# Patient Record
Sex: FEMALE | Race: WHITE | Employment: OTHER | URBAN - METROPOLITAN AREA
[De-identification: names, ages, dates, MRNs, and addresses within clinical notes are randomized per-mention and may not be internally consistent; named-entity substitution may affect disease eponyms.]

---

## 2023-06-05 LAB
ALANINE AMINOTRANSFERASE (SGPT) (U/L) IN SER/PLAS: 11 U/L (ref 7–45)
ALBUMIN (G/DL) IN SER/PLAS: 4.5 G/DL (ref 3.4–5)
ALKALINE PHOSPHATASE (U/L) IN SER/PLAS: 74 U/L (ref 33–136)
ANION GAP IN SER/PLAS: 12 MMOL/L (ref 10–20)
ASPARTATE AMINOTRANSFERASE (SGOT) (U/L) IN SER/PLAS: 15 U/L (ref 9–39)
BILIRUBIN TOTAL (MG/DL) IN SER/PLAS: 0.5 MG/DL (ref 0–1.2)
CALCIUM (MG/DL) IN SER/PLAS: 9.5 MG/DL (ref 8.6–10.3)
CARBAMAZEPINE (UG/ML) IN SER/PLAS: 3.9 UG/ML (ref 4–12)
CARBON DIOXIDE, TOTAL (MMOL/L) IN SER/PLAS: 29 MMOL/L (ref 21–32)
CHLORIDE (MMOL/L) IN SER/PLAS: 103 MMOL/L (ref 98–107)
CHOLESTEROL (MG/DL) IN SER/PLAS: 215 MG/DL (ref 0–199)
CHOLESTEROL IN HDL (MG/DL) IN SER/PLAS: 85.3 MG/DL
CHOLESTEROL/HDL RATIO: 2.5
CREATININE (MG/DL) IN SER/PLAS: 0.78 MG/DL (ref 0.5–1.05)
ERYTHROCYTE DISTRIBUTION WIDTH (RATIO) BY AUTOMATED COUNT: 13.6 % (ref 11.5–14.5)
ERYTHROCYTE MEAN CORPUSCULAR HEMOGLOBIN CONCENTRATION (G/DL) BY AUTOMATED: 31.6 G/DL (ref 32–36)
ERYTHROCYTE MEAN CORPUSCULAR VOLUME (FL) BY AUTOMATED COUNT: 97 FL (ref 80–100)
ERYTHROCYTES (10*6/UL) IN BLOOD BY AUTOMATED COUNT: 4.51 X10E12/L (ref 4–5.2)
GFR FEMALE: 81 ML/MIN/1.73M2
GLUCOSE (MG/DL) IN SER/PLAS: 106 MG/DL (ref 74–99)
HEMATOCRIT (%) IN BLOOD BY AUTOMATED COUNT: 43.7 % (ref 36–46)
HEMOGLOBIN (G/DL) IN BLOOD: 13.8 G/DL (ref 12–16)
LDL: 102 MG/DL (ref 0–99)
LEUKOCYTES (10*3/UL) IN BLOOD BY AUTOMATED COUNT: 4.8 X10E9/L (ref 4.4–11.3)
PLATELETS (10*3/UL) IN BLOOD AUTOMATED COUNT: 149 X10E9/L (ref 150–450)
POTASSIUM (MMOL/L) IN SER/PLAS: 4.1 MMOL/L (ref 3.5–5.3)
PROTEIN TOTAL: 7.1 G/DL (ref 6.4–8.2)
SODIUM (MMOL/L) IN SER/PLAS: 140 MMOL/L (ref 136–145)
THYROTROPIN (MIU/L) IN SER/PLAS BY DETECTION LIMIT <= 0.05 MIU/L: 3.9 MIU/L (ref 0.44–3.98)
TRIGLYCERIDE (MG/DL) IN SER/PLAS: 139 MG/DL (ref 0–149)
UREA NITROGEN (MG/DL) IN SER/PLAS: 12 MG/DL (ref 6–23)
VLDL: 28 MG/DL (ref 0–40)

## 2023-09-09 PROBLEM — J30.2 SEASONAL ALLERGIES: Status: ACTIVE | Noted: 2023-09-09

## 2023-09-09 PROBLEM — G50.0 TRIGEMINAL NEURALGIA: Status: ACTIVE | Noted: 2023-09-09

## 2023-09-09 PROBLEM — N63.20 BREAST MASS, LEFT: Status: ACTIVE | Noted: 2023-09-09

## 2023-09-09 PROBLEM — R49.0 DYSPHONIA: Status: ACTIVE | Noted: 2023-09-09

## 2023-09-09 PROBLEM — Z79.899 ON CARBAMAZEPINE THERAPY: Status: ACTIVE | Noted: 2023-09-09

## 2023-09-09 PROBLEM — E55.9 VITAMIN D DEFICIENCY: Status: ACTIVE | Noted: 2023-09-09

## 2023-09-09 PROBLEM — Z78.0 MENOPAUSE: Status: ACTIVE | Noted: 2023-09-09

## 2023-09-09 PROBLEM — J38.3 VOCAL CORD ATROPHY: Status: ACTIVE | Noted: 2023-09-09

## 2023-09-09 PROBLEM — E66.812 CLASS 2 OBESITY WITH BODY MASS INDEX (BMI) OF 37.0 TO 37.9 IN ADULT: Status: ACTIVE | Noted: 2023-09-09

## 2023-09-09 PROBLEM — E66.9 CLASS 2 OBESITY WITH BODY MASS INDEX (BMI) OF 37.0 TO 37.9 IN ADULT: Status: ACTIVE | Noted: 2023-09-09

## 2023-09-09 PROBLEM — Z17.0 MALIGNANT NEOPLASM OF UPPER-OUTER QUADRANT OF LEFT BREAST IN FEMALE, ESTROGEN RECEPTOR POSITIVE (MULTI): Status: ACTIVE | Noted: 2023-09-09

## 2023-09-09 PROBLEM — R49.0 HOARSENESS: Status: ACTIVE | Noted: 2023-09-09

## 2023-09-09 PROBLEM — I10 ESSENTIAL HYPERTENSION: Status: ACTIVE | Noted: 2023-09-09

## 2023-09-09 PROBLEM — C44.319 BASAL CELL CARCINOMA (BCC) OF CHEEK: Status: ACTIVE | Noted: 2023-09-09

## 2023-09-09 PROBLEM — E78.5 HYPERLIPIDEMIA: Status: ACTIVE | Noted: 2023-09-09

## 2023-09-09 PROBLEM — R92.8 ABNORMAL FINDING ON BREAST IMAGING: Status: ACTIVE | Noted: 2023-09-09

## 2023-09-09 PROBLEM — C50.412 MALIGNANT NEOPLASM OF UPPER-OUTER QUADRANT OF LEFT BREAST IN FEMALE, ESTROGEN RECEPTOR POSITIVE (MULTI): Status: ACTIVE | Noted: 2023-09-09

## 2023-09-09 PROBLEM — J34.89 MAXILLARY SINUS MASS: Status: ACTIVE | Noted: 2023-09-09

## 2023-09-09 RX ORDER — CEPHRADINE 500 MG
1 CAPSULE ORAL EVERY OTHER DAY
COMMUNITY
End: 2024-06-10 | Stop reason: ALTCHOICE

## 2023-09-09 RX ORDER — ACETAMINOPHEN 325 MG/1
650 TABLET ORAL EVERY 6 HOURS PRN
COMMUNITY
Start: 2019-08-27 | End: 2024-06-10 | Stop reason: ALTCHOICE

## 2023-09-09 RX ORDER — AMLODIPINE BESYLATE 5 MG/1
5 TABLET ORAL 2 TIMES DAILY
COMMUNITY
Start: 2021-07-08 | End: 2023-10-13

## 2023-09-09 RX ORDER — METOPROLOL SUCCINATE 100 MG/1
1 TABLET, EXTENDED RELEASE ORAL DAILY
COMMUNITY
Start: 2019-08-27 | End: 2023-12-20

## 2023-09-09 RX ORDER — LISINOPRIL 20 MG/1
20 TABLET ORAL 2 TIMES DAILY
COMMUNITY
Start: 2021-12-28 | End: 2023-12-20

## 2023-09-09 RX ORDER — CARBAMAZEPINE 100 MG/1
100 TABLET, EXTENDED RELEASE ORAL EVERY 12 HOURS
COMMUNITY
End: 2024-06-10 | Stop reason: WASHOUT

## 2023-09-09 RX ORDER — PRAVASTATIN SODIUM 40 MG/1
1 TABLET ORAL DAILY
COMMUNITY
Start: 2019-08-27 | End: 2024-03-07

## 2023-09-09 RX ORDER — GABAPENTIN 300 MG/1
600 CAPSULE ORAL 3 TIMES DAILY
COMMUNITY
End: 2023-12-20

## 2023-09-09 RX ORDER — LORATADINE 10 MG/1
1 TABLET ORAL DAILY
COMMUNITY

## 2023-09-09 RX ORDER — DEXTROMETHORPHAN HYDROBROMIDE, GUAIFENESIN 5; 100 MG/5ML; MG/5ML
1 LIQUID ORAL 2 TIMES DAILY
COMMUNITY

## 2023-09-09 RX ORDER — CEPHALEXIN 500 MG/1
500 CAPSULE ORAL 3 TIMES DAILY
COMMUNITY
End: 2024-06-10 | Stop reason: ALTCHOICE

## 2023-10-12 ENCOUNTER — HOSPITAL ENCOUNTER (OUTPATIENT)
Dept: RADIOLOGY | Facility: HOSPITAL | Age: 73
Discharge: HOME | End: 2023-10-12
Payer: MEDICARE

## 2023-10-12 ENCOUNTER — OFFICE VISIT (OUTPATIENT)
Dept: SURGICAL ONCOLOGY | Facility: HOSPITAL | Age: 73
End: 2023-10-12
Payer: MEDICARE

## 2023-10-12 VITALS — SYSTOLIC BLOOD PRESSURE: 158 MMHG | DIASTOLIC BLOOD PRESSURE: 83 MMHG | HEART RATE: 87 BPM

## 2023-10-12 VITALS — HEIGHT: 67 IN | BODY MASS INDEX: 37.35 KG/M2 | WEIGHT: 238 LBS

## 2023-10-12 DIAGNOSIS — I10 PRIMARY HYPERTENSION: Primary | ICD-10-CM

## 2023-10-12 DIAGNOSIS — Z17.0 MALIGNANT NEOPLASM OF UPPER-OUTER QUADRANT OF LEFT BREAST IN FEMALE, ESTROGEN RECEPTOR POSITIVE (MULTI): Primary | ICD-10-CM

## 2023-10-12 DIAGNOSIS — C50.412 MALIGNANT NEOPLASM OF UPPER-OUTER QUADRANT OF LEFT BREAST IN FEMALE, ESTROGEN RECEPTOR POSITIVE (MULTI): Primary | ICD-10-CM

## 2023-10-12 DIAGNOSIS — C50.412 MALIGNANT NEOPLASM OF UPPER-OUTER QUADRANT OF LEFT FEMALE BREAST (MULTI): ICD-10-CM

## 2023-10-12 DIAGNOSIS — Z17.0 ESTROGEN RECEPTOR POSITIVE STATUS (ER+): ICD-10-CM

## 2023-10-12 PROCEDURE — 77066 DX MAMMO INCL CAD BI: CPT | Performed by: RADIOLOGY

## 2023-10-12 PROCEDURE — 99213 OFFICE O/P EST LOW 20 MIN: CPT | Performed by: SURGERY

## 2023-10-12 PROCEDURE — 3077F SYST BP >= 140 MM HG: CPT | Performed by: SURGERY

## 2023-10-12 PROCEDURE — 3079F DIAST BP 80-89 MM HG: CPT | Performed by: SURGERY

## 2023-10-12 PROCEDURE — 77066 DX MAMMO INCL CAD BI: CPT

## 2023-10-12 PROCEDURE — 3008F BODY MASS INDEX DOCD: CPT | Performed by: SURGERY

## 2023-10-12 PROCEDURE — 1036F TOBACCO NON-USER: CPT | Performed by: SURGERY

## 2023-10-12 PROCEDURE — G0279 TOMOSYNTHESIS, MAMMO: HCPCS | Performed by: RADIOLOGY

## 2023-10-12 NOTE — PROGRESS NOTES
" BREAST SURGICAL ONCOLOGY FOLLOW UP     Subjective   Kelli Ellis is a 73 y.o. female presents today for follow up carcinoma of the left breast.     Treatment history:    Surgery: 12/2/2022 MS PM SLNB (0/1) tumor 1.3cm margins negative [id number]   Radiation: 1/4 - 1/10/2023 in 5fx.   Systemic therapy: mammaprint low risk luminal A +0.329, no chemo.  Ultimately declined endocrine tx.    She had a spot on her left breast- what she describes as a \"brown mole\" on her breast.  This was biopsied by dermatology on 10/5/2023.  Biopsy results are still pending as of today.    Bilateral mammogram today: BIRADS 2    Review of Systems   Constitutional symptoms: Denies generalized fatigue.  Denies weight change, fevers/chills, difficulty sleeping   Eyes: Denies double vision, glaucoma, cataracts.  Ear/nose/throat/mouth: Denies hearing changes, sore throat, sinus problems.  Cardiovascular: No chest pain.  Denies irregular heartbeat.  Denies ankle swelling.  Respiratory: No wheezing, cough, or shortness of breath.  Gastrointestinal: No abdominal pain,  No nausea/vomiting.  No indigestion/heartburn.  No change in bowel habits.  No constipation or diarrhea.   Genitourinary: No urinary incontinence.  No urinary frequency.  No painful urination.  Musculoskeletal: No bone pain, no muscle pain, no joint pain.   Integumentary: No rash. No masses.  No changes in moles.  No easy bruising.  Neurological: No headaches.  No tremors. No numbness/tingling.  Psychiatric: No anxiety. No depression.  Endocrine: No excessive thirst.  Not too hot or too cold.  Not tired or fatigued.    Hematological/lymphatic: No swollen glands or blood clotting problems.  No bruising.    Objective   Physical Exam  General: Alert and oriented x 3.  Mood and affect are appropriate.  HEENT: EOMI, PERRLA.   Neck: supple, no masses, no cervical adenopathy.  Cardiovascular: no lower extremity edema.  Pulmonary: breathing non labored on room air.  GI: Abdomen soft, no " masses. No hepatomegaly or splenomegaly.  Lymph nodes: No supraclavicular or axillary adenopathy bilaterally. Well healed left axillary incision.  Musculoskeletal: Full range of motion in the upper extremities bilaterally.  Neuro: denies dizziness, tremors    Breasts: The breasts were examined in both the seated and supine positions.    RIGHT: The nipple is everted without nipple discharge.  There are no skin changes, skin thickening, or dimpling. There are no masses palpated in the RIGHT breast.  UIQ transverse scar.  LEFT: The nipple is everted without nipple discharge.  There are no skin changes, skin thickening, or dimpling. There are no masses palpated in the LEFT breast. Well healed periareolar incision.      Radiology review: All images and reports were personally reviewed.     Assessment/Plan     left breast DC g1 ER 95% IA 95% HER2- at 1:30 3cmFN measuring 1.3cm on final pathology s/p left MS PM SLNB   -[id number] stage: IA    Clinical breast exam and mammogram today are normal.  There is no evidence of recurrence.    Continue follow up with medical oncology as scheduled.    Will follow up in the breast center with my nurse practitioner partner in 1 year at the time of mammogram or sooner if there are any breast concerns.  I will see Kelli  on an as needed basis for any concerns.      Nataliia Brenner, DO

## 2023-10-13 ENCOUNTER — TELEPHONE (OUTPATIENT)
Dept: SURGICAL ONCOLOGY | Facility: HOSPITAL | Age: 73
End: 2023-10-13
Payer: MEDICARE

## 2023-10-13 RX ORDER — AMLODIPINE BESYLATE 5 MG/1
5 TABLET ORAL 2 TIMES DAILY
Qty: 180 TABLET | Refills: 1 | Status: SHIPPED | OUTPATIENT
Start: 2023-10-13 | End: 2024-04-18

## 2023-10-13 NOTE — TELEPHONE ENCOUNTER
I relayed to Ms. Ellis that Dr. Brenner received her pathology from the dermatologist and it is benign and not related to her breast cancer.  She recommends continued follow-up and skin checks as determined by the dermatologist.

## 2023-10-25 ENCOUNTER — TELEPHONE (OUTPATIENT)
Dept: PRIMARY CARE | Facility: CLINIC | Age: 73
End: 2023-10-25
Payer: MEDICARE

## 2023-11-13 ENCOUNTER — OFFICE VISIT (OUTPATIENT)
Dept: PRIMARY CARE | Facility: CLINIC | Age: 73
End: 2023-11-13
Payer: MEDICARE

## 2023-11-13 VITALS
HEIGHT: 66 IN | BODY MASS INDEX: 38.22 KG/M2 | WEIGHT: 237.8 LBS | SYSTOLIC BLOOD PRESSURE: 164 MMHG | HEART RATE: 79 BPM | DIASTOLIC BLOOD PRESSURE: 85 MMHG | TEMPERATURE: 97.7 F

## 2023-11-13 DIAGNOSIS — E78.49 OTHER HYPERLIPIDEMIA: ICD-10-CM

## 2023-11-13 DIAGNOSIS — E66.09 CLASS 2 OBESITY DUE TO EXCESS CALORIES WITHOUT SERIOUS COMORBIDITY WITH BODY MASS INDEX (BMI) OF 37.0 TO 37.9 IN ADULT: ICD-10-CM

## 2023-11-13 DIAGNOSIS — G50.0 TRIGEMINAL NEURALGIA: ICD-10-CM

## 2023-11-13 DIAGNOSIS — Z23 NEED FOR INFLUENZA VACCINATION: ICD-10-CM

## 2023-11-13 DIAGNOSIS — I10 ESSENTIAL HYPERTENSION: Primary | ICD-10-CM

## 2023-11-13 PROCEDURE — 3079F DIAST BP 80-89 MM HG: CPT | Performed by: FAMILY MEDICINE

## 2023-11-13 PROCEDURE — 1160F RVW MEDS BY RX/DR IN RCRD: CPT | Performed by: FAMILY MEDICINE

## 2023-11-13 PROCEDURE — 3077F SYST BP >= 140 MM HG: CPT | Performed by: FAMILY MEDICINE

## 2023-11-13 PROCEDURE — 99213 OFFICE O/P EST LOW 20 MIN: CPT | Performed by: FAMILY MEDICINE

## 2023-11-13 PROCEDURE — 90662 IIV NO PRSV INCREASED AG IM: CPT | Performed by: FAMILY MEDICINE

## 2023-11-13 PROCEDURE — 1036F TOBACCO NON-USER: CPT | Performed by: FAMILY MEDICINE

## 2023-11-13 PROCEDURE — 3008F BODY MASS INDEX DOCD: CPT | Performed by: FAMILY MEDICINE

## 2023-11-13 PROCEDURE — G0008 ADMIN INFLUENZA VIRUS VAC: HCPCS | Performed by: FAMILY MEDICINE

## 2023-11-13 PROCEDURE — 1159F MED LIST DOCD IN RCRD: CPT | Performed by: FAMILY MEDICINE

## 2023-11-13 ASSESSMENT — PATIENT HEALTH QUESTIONNAIRE - PHQ9
SUM OF ALL RESPONSES TO PHQ9 QUESTIONS 1 AND 2: 0
2. FEELING DOWN, DEPRESSED OR HOPELESS: NOT AT ALL
1. LITTLE INTEREST OR PLEASURE IN DOING THINGS: NOT AT ALL

## 2023-11-14 ASSESSMENT — ENCOUNTER SYMPTOMS
CARDIOVASCULAR NEGATIVE: 1
MUSCULOSKELETAL NEGATIVE: 1
GASTROINTESTINAL NEGATIVE: 1
RESPIRATORY NEGATIVE: 1
NEUROLOGICAL NEGATIVE: 1
PSYCHIATRIC NEGATIVE: 1
CONSTITUTIONAL NEGATIVE: 1
ENDOCRINE NEGATIVE: 1
ALLERGIC/IMMUNOLOGIC NEGATIVE: 1
EYES NEGATIVE: 1

## 2023-11-14 NOTE — PROGRESS NOTES
Zabrina Pereira is here for a follow-up on hypertension and trigeminal neuralgia.  She states that she is overall feeling well.  Her left leg wound has been healing.  She did go to the Center for treatment for this.  Her trigeminal neuralgia has been stable on the medications as noted.  She noticed a umbilical hernia several weeks ago without any discomfort redness or bowel problems.  Home blood pressures have been in the 140s over 80s.  She continues on her meds noted.    Patient ID: Kelli Ellis is a 73 y.o. female who presents for Follow-up (Umbilical hernia re-emerged:  mole removal on left chest: left leg swelling improving overall: ):    Problem List Items Addressed This Visit    None  Visit Diagnoses       Need for influenza vaccination        Relevant Orders    Flu vaccine, quadrivalent, high-dose, preservative free, age 65y+ (FLUZONE) (Completed)           Past Medical History:   Diagnosis Date    Body mass index (BMI) 36.0-36.9, adult 09/28/2021    BMI 36.0-36.9,adult    Breast cancer (CMS/HCC)     Dysphonia 11/16/2020    Dysphonia    Encounter for screening for malignant neoplasm of colon     Screen for colon cancer    Obesity, unspecified 05/19/2022    Class 2 obesity with body mass index (BMI) of 36.0 to 36.9 in adult    Personal history of irradiation     Personal history of malignant neoplasm of breast     History of malignant neoplasm of female breast    Personal history of other diseases of the circulatory system     History of hypertension    Personal history of other endocrine, nutritional and metabolic disease 09/28/2021    History of obesity    Personal history of other endocrine, nutritional and metabolic disease     History of high cholesterol    Personal history of other specified conditions 09/28/2021    History of hoarseness      Past Surgical History:   Procedure Laterality Date    BREAST BIOPSY      BREAST LUMPECTOMY      MOLE REMOVAL Left     chest  10/25/2023    OTHER SURGICAL HISTORY   08/01/2019    Hammer toe surgery    OTHER SURGICAL HISTORY  08/01/2019    Tubal ligation    OTHER SURGICAL HISTORY  05/19/2022    Mohs surgery    OTHER SURGICAL HISTORY  05/19/2022    Skin biopsy      Family History   Problem Relation Name Age of Onset    Other (cardiac disorder) Mother      Hyperlipidemia Mother      Hypertension Mother      Other (malignant neoplasm of vulva) Mother        Social History     Socioeconomic History    Marital status:      Spouse name: Not on file    Number of children: Not on file    Years of education: Not on file    Highest education level: Not on file   Occupational History    Not on file   Tobacco Use    Smoking status: Former     Types: Cigarettes    Smokeless tobacco: Never   Substance and Sexual Activity    Alcohol use: Yes     Alcohol/week: 2.0 standard drinks of alcohol     Types: 2 Standard drinks or equivalent per week    Drug use: Never    Sexual activity: Not on file   Other Topics Concern    Not on file   Social History Narrative    Not on file     Social Determinants of Health     Financial Resource Strain: Not on file   Food Insecurity: Not on file   Transportation Needs: Not on file   Physical Activity: Not on file   Stress: Not on file   Social Connections: Not on file   Intimate Partner Violence: Not on file   Housing Stability: Not on file      Chlorthalidone, Methylprednisolone, and Triamterene-hydrochlorothiazid   Current Outpatient Medications   Medication Sig Dispense Refill    acetaminophen (Tylenol 8 HOUR) 650 mg ER tablet Take 1 tablet (650 mg) by mouth 2 times a day.      amLODIPine (Norvasc) 5 mg tablet TAKE 1 TABLET BY MOUTH TWICE  DAILY 180 tablet 1    carBAMazepine XR (TEGretol XR) 100 mg 12 hr tablet Take 1 tablet (100 mg) by mouth every 12 hours.      cholecalciferol, vitamin D3, 250 mcg (10,000 unit) capsule Take 1 capsule (250 mcg) by mouth every other day.      gabapentin (Neurontin) 300 mg capsule Take 2 capsules (600 mg) by mouth 3 times  a day.      lisinopril 20 mg tablet Take 1 tablet (20 mg) by mouth twice a day.      loratadine (Claritin) 10 mg tablet Take 1 tablet (10 mg) by mouth once daily.      metoprolol succinate XL (Toprol-XL) 100 mg 24 hr tablet Take 1 tablet (100 mg) by mouth once daily.      pravastatin (Pravachol) 40 mg tablet Take 1 tablet (40 mg) by mouth once daily.      acetaminophen (Tylenol) 325 mg tablet Take 2 tablets (650 mg) by mouth every 6 hours if needed for mild pain (1 - 3).      cephalexin (Keflex) 500 mg capsule Take 1 capsule (500 mg) by mouth 3 times a day.       No current facility-administered medications for this visit.       Immunization History   Administered Date(s) Administered    Flu vaccine (IIV4), preservative free *Check age/dose* 11/15/2016, 11/01/2022    Flu vaccine, quadrivalent, high-dose, preservative free, age 65y+ (FLUZONE) 11/13/2023    Influenza, High Dose Seasonal, Preservative Free 10/18/2017, 09/24/2018, 10/01/2019, 10/01/2020    Influenza, Seasonal, Quadrivalent, Adjuvanted 10/11/2021    Influenza, Unspecified 12/13/2005, 12/11/2006, 10/17/2008, 12/01/2012, 11/01/2013, 09/01/2014, 10/06/2015, 10/01/2021    Moderna COVID-19 vaccine, bivalent, blue cap/gray label *Check age/dose* 10/17/2022    Moderna SARS-CoV-2 Vaccination 02/16/2021, 03/19/2021, 11/08/2021    Pneumococcal conjugate vaccine, 13-valent (PREVNAR 13) 10/18/2017    Pneumococcal polysaccharide vaccine, 23-valent, age 2 years and older (PNEUMOVAX 23) 09/24/2018    Tdap vaccine, age 7 year and older (BOOSTRIX) 08/14/2023    Zoster vaccine, recombinant, adult (SHINGRIX) 07/19/2021, 01/18/2022        Review of Systems   Constitutional: Negative.    HENT: Negative.     Eyes: Negative.    Respiratory: Negative.     Cardiovascular: Negative.    Gastrointestinal: Negative.    Endocrine: Negative.    Genitourinary: Negative.    Musculoskeletal: Negative.    Allergic/Immunologic: Negative.    Neurological: Negative.     Psychiatric/Behavioral: Negative.     All other systems reviewed and are negative.       Vitals:    11/13/23 1109   BP: 164/85   Pulse: 79   Temp: 36.5 °C (97.7 °F)     Vitals:    11/13/23 1109   Weight: 108 kg (237 lb 12.8 oz)      Physical Exam  Constitutional:       General: She is not in acute distress.     Appearance: Normal appearance.   Neck:      Vascular: No carotid bruit.   Cardiovascular:      Rate and Rhythm: Normal rate and regular rhythm.      Pulses: Normal pulses.      Heart sounds: Murmur heard.      No friction rub. No gallop.   Pulmonary:      Effort: Pulmonary effort is normal.      Breath sounds: Normal breath sounds.   Musculoskeletal:      Cervical back: Neck supple.   Neurological:      Mental Status: She is alert.   Psychiatric:         Mood and Affect: Mood normal.         Thought Content: Thought content normal.     Heart-regular S1-S2 with a grade 1/6 systolic ejection murmur at the left sternal border.  Left leg-the laceration is now healed with scar tissue forming.  There is diffuse swelling to the lower leg which the patient says has been chronic.  Abdomen-there is a very small umbilical hernia just to the superior aspect of the umbilicus it is soft without any redness firmness or tenderness  ASSESSMENT/PLAN:  Hypertension with elevated reading and whitecoat component  Trigeminal neuralgia stable  Cardiac murmur  Hyperlipidemia  History of breast cancer  Small umbilical hernia    Plan--continue current meds noted.  Continue to monitor home blood pressures  Begin exercise program and observe low-salt diet  Schedule for echocardiogram  Labs as noted are up-to-date  Follow-up with breast surgeon as scheduled  Monitor umbilical hernia for now call if any pain swelling or redness occur  Follow-up in 6 months and call as needed

## 2023-12-18 DIAGNOSIS — G50.0 TRIGEMINAL NEURALGIA: ICD-10-CM

## 2023-12-18 DIAGNOSIS — E78.49 OTHER HYPERLIPIDEMIA: ICD-10-CM

## 2023-12-18 DIAGNOSIS — I10 ESSENTIAL HYPERTENSION: ICD-10-CM

## 2023-12-20 RX ORDER — METOPROLOL SUCCINATE 100 MG/1
100 TABLET, EXTENDED RELEASE ORAL DAILY
Qty: 90 TABLET | Refills: 1 | Status: SHIPPED | OUTPATIENT
Start: 2023-12-20 | End: 2024-05-23

## 2023-12-20 RX ORDER — GABAPENTIN 300 MG/1
600 CAPSULE ORAL 3 TIMES DAILY
Qty: 540 CAPSULE | Refills: 1 | Status: SHIPPED | OUTPATIENT
Start: 2023-12-20 | End: 2024-05-23

## 2023-12-20 RX ORDER — LISINOPRIL 20 MG/1
20 TABLET ORAL 2 TIMES DAILY
Qty: 180 TABLET | Refills: 1 | Status: SHIPPED | OUTPATIENT
Start: 2023-12-20 | End: 2024-05-23

## 2023-12-20 RX ORDER — CARBAMAZEPINE 100 MG/1
100 CAPSULE, EXTENDED RELEASE ORAL EVERY 12 HOURS
Qty: 180 CAPSULE | Refills: 1 | Status: SHIPPED | OUTPATIENT
Start: 2023-12-20 | End: 2024-05-23

## 2024-02-07 ENCOUNTER — TELEPHONE (OUTPATIENT)
Dept: PRIMARY CARE | Facility: CLINIC | Age: 74
End: 2024-02-07
Payer: MEDICARE

## 2024-02-07 DIAGNOSIS — R01.1 MURMUR: Primary | ICD-10-CM

## 2024-02-07 NOTE — TELEPHONE ENCOUNTER
Patient calling to schedule Echo. No order in chart. Please place order and send to [facility] for scheduling. thanks

## 2024-02-16 ENCOUNTER — HOSPITAL ENCOUNTER (OUTPATIENT)
Dept: CARDIOLOGY | Facility: CLINIC | Age: 74
Discharge: HOME | End: 2024-02-16
Payer: MEDICARE

## 2024-02-16 VITALS
WEIGHT: 237 LBS | HEIGHT: 66 IN | BODY MASS INDEX: 38.09 KG/M2 | SYSTOLIC BLOOD PRESSURE: 154 MMHG | DIASTOLIC BLOOD PRESSURE: 90 MMHG

## 2024-02-16 DIAGNOSIS — R01.1 MURMUR: ICD-10-CM

## 2024-02-16 PROCEDURE — 93306 TTE W/DOPPLER COMPLETE: CPT | Performed by: INTERNAL MEDICINE

## 2024-02-16 PROCEDURE — 93306 TTE W/DOPPLER COMPLETE: CPT

## 2024-02-18 LAB
AORTIC VALVE MEAN GRADIENT: 13 MMHG
AORTIC VALVE PEAK VELOCITY: 2.45 M/S
AV PEAK GRADIENT: 24 MMHG
AVA (PEAK VEL): 1.59 CM2
AVA (VTI): 1.55 CM2
EJECTION FRACTION APICAL 4 CHAMBER: 58.1
LEFT VENTRICLE INTERNAL DIMENSION DIASTOLE: 4.49 CM (ref 3.5–6)
LEFT VENTRICULAR OUTFLOW TRACT DIAMETER: 1.9 CM
MITRAL VALVE E/A RATIO: 1.07
MITRAL VALVE E/E' RATIO: 8.7
RIGHT VENTRICLE PEAK SYSTOLIC PRESSURE: 39.2 MMHG

## 2024-03-17 DIAGNOSIS — I10 PRIMARY HYPERTENSION: ICD-10-CM

## 2024-03-19 RX ORDER — AMLODIPINE BESYLATE 5 MG/1
5 TABLET ORAL 2 TIMES DAILY
Qty: 180 TABLET | Refills: 1 | OUTPATIENT
Start: 2024-03-19

## 2024-04-17 DIAGNOSIS — I10 PRIMARY HYPERTENSION: ICD-10-CM

## 2024-04-18 RX ORDER — AMLODIPINE BESYLATE 5 MG/1
5 TABLET ORAL 2 TIMES DAILY
Qty: 180 TABLET | Refills: 1 | Status: SHIPPED | OUTPATIENT
Start: 2024-04-18 | End: 2024-06-10 | Stop reason: SDUPTHER

## 2024-05-23 DIAGNOSIS — G50.0 TRIGEMINAL NEURALGIA: ICD-10-CM

## 2024-05-23 DIAGNOSIS — I10 ESSENTIAL HYPERTENSION: ICD-10-CM

## 2024-05-23 RX ORDER — CARBAMAZEPINE 100 MG/1
100 CAPSULE, EXTENDED RELEASE ORAL EVERY 12 HOURS
Qty: 180 CAPSULE | Refills: 1 | Status: SHIPPED | OUTPATIENT
Start: 2024-05-23

## 2024-05-23 RX ORDER — GABAPENTIN 300 MG/1
600 CAPSULE ORAL 3 TIMES DAILY
Qty: 180 CAPSULE | Refills: 1 | Status: SHIPPED | OUTPATIENT
Start: 2024-05-23

## 2024-05-23 RX ORDER — METOPROLOL SUCCINATE 100 MG/1
100 TABLET, EXTENDED RELEASE ORAL DAILY
Qty: 90 TABLET | Refills: 1 | Status: SHIPPED | OUTPATIENT
Start: 2024-05-23

## 2024-05-23 RX ORDER — LISINOPRIL 20 MG/1
20 TABLET ORAL 2 TIMES DAILY
Qty: 180 TABLET | Refills: 1 | Status: SHIPPED | OUTPATIENT
Start: 2024-05-23

## 2024-06-07 DIAGNOSIS — E78.49 OTHER HYPERLIPIDEMIA: ICD-10-CM

## 2024-06-10 ENCOUNTER — OFFICE VISIT (OUTPATIENT)
Dept: PRIMARY CARE | Facility: CLINIC | Age: 74
End: 2024-06-10
Payer: MEDICARE

## 2024-06-10 VITALS
SYSTOLIC BLOOD PRESSURE: 151 MMHG | HEART RATE: 71 BPM | HEIGHT: 66 IN | DIASTOLIC BLOOD PRESSURE: 81 MMHG | BODY MASS INDEX: 38.73 KG/M2 | TEMPERATURE: 97.7 F | WEIGHT: 241 LBS

## 2024-06-10 DIAGNOSIS — E78.2 MIXED HYPERLIPIDEMIA: ICD-10-CM

## 2024-06-10 DIAGNOSIS — G50.0 TRIGEMINAL NEURALGIA: ICD-10-CM

## 2024-06-10 DIAGNOSIS — Z79.899 ON CARBAMAZEPINE THERAPY: ICD-10-CM

## 2024-06-10 DIAGNOSIS — I10 ESSENTIAL HYPERTENSION: ICD-10-CM

## 2024-06-10 PROCEDURE — 3077F SYST BP >= 140 MM HG: CPT | Performed by: FAMILY MEDICINE

## 2024-06-10 PROCEDURE — 1159F MED LIST DOCD IN RCRD: CPT | Performed by: FAMILY MEDICINE

## 2024-06-10 PROCEDURE — 3079F DIAST BP 80-89 MM HG: CPT | Performed by: FAMILY MEDICINE

## 2024-06-10 PROCEDURE — 1160F RVW MEDS BY RX/DR IN RCRD: CPT | Performed by: FAMILY MEDICINE

## 2024-06-10 PROCEDURE — 1036F TOBACCO NON-USER: CPT | Performed by: FAMILY MEDICINE

## 2024-06-10 PROCEDURE — 3008F BODY MASS INDEX DOCD: CPT | Performed by: FAMILY MEDICINE

## 2024-06-10 PROCEDURE — 99213 OFFICE O/P EST LOW 20 MIN: CPT | Performed by: FAMILY MEDICINE

## 2024-06-10 RX ORDER — AMLODIPINE BESYLATE 5 MG/1
5 TABLET ORAL DAILY
Qty: 90 TABLET | Refills: 1 | Status: SHIPPED | OUTPATIENT
Start: 2024-06-10 | End: 2024-06-10 | Stop reason: SDUPTHER

## 2024-06-10 RX ORDER — BISMUTH SUBSALICYLATE 262 MG
1 TABLET,CHEWABLE ORAL DAILY
COMMUNITY

## 2024-06-10 RX ORDER — METOPROLOL SUCCINATE 25 MG/1
25 TABLET, EXTENDED RELEASE ORAL DAILY
Qty: 90 TABLET | Refills: 1 | Status: SHIPPED | OUTPATIENT
Start: 2024-06-10

## 2024-06-10 RX ORDER — PRAVASTATIN SODIUM 40 MG/1
40 TABLET ORAL NIGHTLY
Qty: 90 TABLET | Refills: 1 | Status: SHIPPED | OUTPATIENT
Start: 2024-06-10

## 2024-06-10 RX ORDER — AMLODIPINE BESYLATE 5 MG/1
5 TABLET ORAL DAILY
Qty: 90 TABLET | Refills: 1 | Status: SHIPPED | OUTPATIENT
Start: 2024-06-10

## 2024-06-10 ASSESSMENT — PATIENT HEALTH QUESTIONNAIRE - PHQ9
1. LITTLE INTEREST OR PLEASURE IN DOING THINGS: NOT AT ALL
2. FEELING DOWN, DEPRESSED OR HOPELESS: NOT AT ALL
SUM OF ALL RESPONSES TO PHQ9 QUESTIONS 1 AND 2: 0

## 2024-06-10 NOTE — PROGRESS NOTES
Zabrina Pereira is here for follow-up on hypertension and hyperlipidemia.  She states that overall she has been doing well.  She has been noticing more swelling around her ankles.  She is on amlodipine 5 mg twice daily as well as her other meds.  She has no other complaints at the present time.  She sees Dr. Brenner for her breast cancer follow-up.    Patient ID: Kelli Ellis is a 73 y.o. female who presents for Follow-up:    Problem List Items Addressed This Visit    None     Past Medical History:   Diagnosis Date    Body mass index (BMI) 36.0-36.9, adult 09/28/2021    BMI 36.0-36.9,adult    Breast cancer (Multi)     Dysphonia 11/16/2020    Dysphonia    Encounter for screening for malignant neoplasm of colon     Screen for colon cancer    Obesity, unspecified 05/19/2022    Class 2 obesity with body mass index (BMI) of 36.0 to 36.9 in adult    Personal history of irradiation     Personal history of malignant neoplasm of breast     History of malignant neoplasm of female breast    Personal history of other diseases of the circulatory system     History of hypertension    Personal history of other endocrine, nutritional and metabolic disease 09/28/2021    History of obesity    Personal history of other endocrine, nutritional and metabolic disease     History of high cholesterol    Personal history of other specified conditions 09/28/2021    History of hoarseness      Past Surgical History:   Procedure Laterality Date    BREAST BIOPSY      BREAST LUMPECTOMY      MOLE REMOVAL Left     chest  10/25/2023    OTHER SURGICAL HISTORY  08/01/2019    Hammer toe surgery    OTHER SURGICAL HISTORY  08/01/2019    Tubal ligation    OTHER SURGICAL HISTORY  05/19/2022    Mohs surgery    OTHER SURGICAL HISTORY  05/19/2022    Skin biopsy      Family History   Problem Relation Name Age of Onset    Other (cardiac disorder) Mother      Hyperlipidemia Mother      Hypertension Mother      Other (malignant neoplasm of vulva) Mother         Social History     Socioeconomic History    Marital status:      Spouse name: Not on file    Number of children: Not on file    Years of education: Not on file    Highest education level: Not on file   Occupational History    Not on file   Tobacco Use    Smoking status: Former     Types: Cigarettes    Smokeless tobacco: Never   Substance and Sexual Activity    Alcohol use: Yes     Alcohol/week: 2.0 standard drinks of alcohol     Types: 2 Standard drinks or equivalent per week    Drug use: Never    Sexual activity: Not on file   Other Topics Concern    Not on file   Social History Narrative    Not on file     Social Determinants of Health     Financial Resource Strain: Not on file   Food Insecurity: Not on file   Transportation Needs: Not on file   Physical Activity: Not on file   Stress: Not on file   Social Connections: Not on file   Intimate Partner Violence: Not on file   Housing Stability: Not on file      Chlorthalidone, Methylprednisolone, and Triamterene-hydrochlorothiazid   Current Outpatient Medications   Medication Sig Dispense Refill    acetaminophen (Tylenol 8 HOUR) 650 mg ER tablet Take 1 tablet (650 mg) by mouth 2 times a day.      amLODIPine (Norvasc) 5 mg tablet TAKE 1 TABLET BY MOUTH TWICE  DAILY 180 tablet 1    carBAMazepine (Carbatrol) 100 mg 12 hr capsule TAKE 1 CAPSULE BY MOUTH EVERY 12 HOURS 180 capsule 1    gabapentin (Neurontin) 300 mg capsule TAKE 2 CAPSULES BY MOUTH 3 TIMES DAILY 180 capsule 1    lisinopril 20 mg tablet TAKE 1 TABLET BY MOUTH TWICE  DAILY 180 tablet 1    loratadine (Claritin) 10 mg tablet Take 1 tablet (10 mg) by mouth once daily.      metoprolol succinate XL (Toprol-XL) 100 mg 24 hr tablet TAKE 1 TABLET BY MOUTH DAILY 90 tablet 1    multivitamin tablet Take 1 tablet by mouth once daily.      pravastatin (Pravachol) 40 mg tablet TAKE 1 TABLET BY MOUTH AT  BEDTIME 90 tablet 1    acetaminophen (Tylenol) 325 mg tablet Take 2 tablets (650 mg) by mouth every 6 hours if  needed for mild pain (1 - 3).      carBAMazepine XR (TEGretol XR) 100 mg 12 hr tablet Take 1 tablet (100 mg) by mouth every 12 hours.      cephalexin (Keflex) 500 mg capsule Take 1 capsule (500 mg) by mouth 3 times a day.      cholecalciferol, vitamin D3, 250 mcg (10,000 unit) capsule Take 1 capsule (250 mcg) by mouth every other day.       No current facility-administered medications for this visit.       Immunization History   Administered Date(s) Administered    Flu vaccine (IIV4), preservative free *Check age/dose* 11/15/2016, 11/01/2022    Flu vaccine, quadrivalent, high-dose, preservative free, age 65y+ (FLUZONE) 11/13/2023    Influenza, High Dose Seasonal, Preservative Free 10/18/2017, 09/24/2018, 10/01/2019, 10/01/2020    Influenza, Seasonal, Quadrivalent, Adjuvanted 10/11/2021    Influenza, Unspecified 12/13/2005, 12/11/2006, 10/17/2008, 12/01/2012, 11/01/2013, 09/01/2014, 10/06/2015, 10/01/2021    Moderna COVID-19 vaccine, Fall 2023, 12 yeasrs and older (50mcg/0.5mL) 01/18/2024    Moderna COVID-19 vaccine, bivalent, blue cap/gray label *Check age/dose* 10/17/2022    Moderna SARS-CoV-2 Vaccination 02/16/2021, 03/19/2021, 11/08/2021    Pneumococcal conjugate vaccine, 13-valent (PREVNAR 13) 10/18/2017    Pneumococcal polysaccharide vaccine, 23-valent, age 2 years and older (PNEUMOVAX 23) 09/24/2018    Tdap vaccine, age 7 year and older (BOOSTRIX, ADACEL) 08/14/2023    Zoster vaccine, recombinant, adult (SHINGRIX) 07/19/2021, 01/18/2022        Review of Systems   Constitutional: Negative.    HENT: Negative.     Eyes: Negative.    Respiratory: Negative.     Cardiovascular: Negative.    Gastrointestinal: Negative.    Endocrine: Negative.    Genitourinary: Negative.    Musculoskeletal: Negative.    Skin: Negative.    Allergic/Immunologic: Negative.    Neurological: Negative.    Hematological: Negative.    Psychiatric/Behavioral: Negative.     All other systems reviewed and are negative.       Vitals:    06/10/24  0955   BP: 151/81   Pulse: 71   Temp: 36.5 °C (97.7 °F)     Vitals:    06/10/24 0955   Weight: 109 kg (241 lb)      Physical Exam  Constitutional:       General: She is not in acute distress.     Appearance: Normal appearance.   Cardiovascular:      Rate and Rhythm: Normal rate and regular rhythm.      Pulses: Normal pulses.      Heart sounds: Normal heart sounds. No murmur heard.     No friction rub. No gallop.   Pulmonary:      Effort: Pulmonary effort is normal. No respiratory distress.      Breath sounds: Normal breath sounds. No wheezing.   Musculoskeletal:      Right lower leg: Edema present.      Left lower leg: Edema present.   Neurological:      Mental Status: She is alert.          ASSESSMENT/PLAN: Hypertension with elevated reading.  Pedal edema probably secondary to amlodipine.  Decrease amlodipine 5 mg to 1 tab daily.  Continue lisinopril 20 mg twice daily.  Continue metoprolol succinate 100 mg in the morning and add 25 mg in the evening.  Continue to monitor home blood pressures.    Hyper lipidemia..  Check CMP lipid profile and TSH    Trigeminal neuralgia.  Continue current meds noted.  Patient is questioning about decreasing gabapentin.  She may gradually decreased the dosage of this medication.  Check carbamazepine level and CBC.    History of breast cancer.  Follow-up with breast surgeon who also orders mammograms.    Exercise regularly  Follow-up in 2 months and call as needed   Scribe Attestation  By signing my name below, I, Kinsey Yanez LPN, Scribe   attest that this documentation has been prepared under the direction and in the presence of Isaac Mendoza MD.

## 2024-06-10 NOTE — PATIENT INSTRUCTIONS
Decrease amlodipine to once daily  Add metoprolol succinate 25 mg in evening  Obtain labs  Follow up 2 months after beginning new blood pressure medication

## 2024-06-11 ASSESSMENT — ENCOUNTER SYMPTOMS
ALLERGIC/IMMUNOLOGIC NEGATIVE: 1
GASTROINTESTINAL NEGATIVE: 1
CARDIOVASCULAR NEGATIVE: 1
HEMATOLOGIC/LYMPHATIC NEGATIVE: 1
PSYCHIATRIC NEGATIVE: 1
NEUROLOGICAL NEGATIVE: 1
RESPIRATORY NEGATIVE: 1
CONSTITUTIONAL NEGATIVE: 1
ENDOCRINE NEGATIVE: 1
EYES NEGATIVE: 1
MUSCULOSKELETAL NEGATIVE: 1

## 2024-07-21 DIAGNOSIS — G50.0 TRIGEMINAL NEURALGIA: ICD-10-CM

## 2024-07-23 RX ORDER — GABAPENTIN 300 MG/1
600 CAPSULE ORAL 3 TIMES DAILY
Qty: 360 CAPSULE | Refills: 1 | Status: SHIPPED | OUTPATIENT
Start: 2024-07-23

## 2024-08-06 ENCOUNTER — LAB (OUTPATIENT)
Dept: LAB | Facility: LAB | Age: 74
End: 2024-08-06
Payer: MEDICARE

## 2024-08-06 DIAGNOSIS — I10 ESSENTIAL HYPERTENSION: ICD-10-CM

## 2024-08-06 DIAGNOSIS — Z79.899 ON CARBAMAZEPINE THERAPY: ICD-10-CM

## 2024-08-06 DIAGNOSIS — E78.2 MIXED HYPERLIPIDEMIA: ICD-10-CM

## 2024-08-06 DIAGNOSIS — G50.0 TRIGEMINAL NEURALGIA: ICD-10-CM

## 2024-08-06 LAB
ALBUMIN SERPL BCP-MCNC: 4.1 G/DL (ref 3.4–5)
ALP SERPL-CCNC: 71 U/L (ref 33–136)
ALT SERPL W P-5'-P-CCNC: 12 U/L (ref 7–45)
ANION GAP SERPL CALC-SCNC: 10 MMOL/L (ref 10–20)
AST SERPL W P-5'-P-CCNC: 14 U/L (ref 9–39)
BILIRUB SERPL-MCNC: 0.4 MG/DL (ref 0–1.2)
BUN SERPL-MCNC: 11 MG/DL (ref 6–23)
CALCIUM SERPL-MCNC: 9.1 MG/DL (ref 8.6–10.3)
CARBAMAZEPINE SERPL-MCNC: 4.5 UG/ML (ref 4–12)
CHLORIDE SERPL-SCNC: 105 MMOL/L (ref 98–107)
CHOLEST SERPL-MCNC: 202 MG/DL (ref 0–199)
CHOLESTEROL/HDL RATIO: 2.5
CO2 SERPL-SCNC: 27 MMOL/L (ref 21–32)
CREAT SERPL-MCNC: 0.65 MG/DL (ref 0.5–1.05)
EGFRCR SERPLBLD CKD-EPI 2021: >90 ML/MIN/1.73M*2
ERYTHROCYTE [DISTWIDTH] IN BLOOD BY AUTOMATED COUNT: 14 % (ref 11.5–14.5)
GLUCOSE SERPL-MCNC: 98 MG/DL (ref 74–99)
HCT VFR BLD AUTO: 43.5 % (ref 36–46)
HDLC SERPL-MCNC: 81.3 MG/DL
HGB BLD-MCNC: 14.3 G/DL (ref 12–16)
LDLC SERPL CALC-MCNC: 89 MG/DL
MCH RBC QN AUTO: 30.9 PG (ref 26–34)
MCHC RBC AUTO-ENTMCNC: 32.9 G/DL (ref 32–36)
MCV RBC AUTO: 94 FL (ref 80–100)
NON HDL CHOLESTEROL: 121 MG/DL (ref 0–149)
NRBC BLD-RTO: 0 /100 WBCS (ref 0–0)
PLATELET # BLD AUTO: 147 X10*3/UL (ref 150–450)
POTASSIUM SERPL-SCNC: 4.1 MMOL/L (ref 3.5–5.3)
PROT SERPL-MCNC: 6.6 G/DL (ref 6.4–8.2)
RBC # BLD AUTO: 4.63 X10*6/UL (ref 4–5.2)
SODIUM SERPL-SCNC: 138 MMOL/L (ref 136–145)
TRIGL SERPL-MCNC: 159 MG/DL (ref 0–149)
TSH SERPL-ACNC: 2.71 MIU/L (ref 0.44–3.98)
VLDL: 32 MG/DL (ref 0–40)
WBC # BLD AUTO: 5.7 X10*3/UL (ref 4.4–11.3)

## 2024-08-06 PROCEDURE — 85027 COMPLETE CBC AUTOMATED: CPT

## 2024-08-06 PROCEDURE — 80053 COMPREHEN METABOLIC PANEL: CPT

## 2024-08-06 PROCEDURE — 80061 LIPID PANEL: CPT

## 2024-08-06 PROCEDURE — 36415 COLL VENOUS BLD VENIPUNCTURE: CPT

## 2024-08-06 PROCEDURE — 84443 ASSAY THYROID STIM HORMONE: CPT

## 2024-08-06 PROCEDURE — 80156 ASSAY CARBAMAZEPINE TOTAL: CPT

## 2024-08-29 ENCOUNTER — APPOINTMENT (OUTPATIENT)
Dept: PRIMARY CARE | Facility: CLINIC | Age: 74
End: 2024-08-29
Payer: MEDICARE

## 2024-08-29 VITALS
HEIGHT: 66 IN | SYSTOLIC BLOOD PRESSURE: 167 MMHG | BODY MASS INDEX: 38.89 KG/M2 | TEMPERATURE: 98 F | WEIGHT: 242 LBS | HEART RATE: 73 BPM | DIASTOLIC BLOOD PRESSURE: 96 MMHG

## 2024-08-29 DIAGNOSIS — I10 ESSENTIAL HYPERTENSION: ICD-10-CM

## 2024-08-29 DIAGNOSIS — E66.9 CLASS 2 OBESITY WITH BODY MASS INDEX (BMI) OF 39.0 TO 39.9 IN ADULT, UNSPECIFIED OBESITY TYPE, UNSPECIFIED WHETHER SERIOUS COMORBIDITY PRESENT: ICD-10-CM

## 2024-08-29 DIAGNOSIS — Z87.891 FORMER SMOKER: ICD-10-CM

## 2024-08-29 DIAGNOSIS — E78.2 MIXED HYPERLIPIDEMIA: ICD-10-CM

## 2024-08-29 DIAGNOSIS — Z00.00 MEDICARE ANNUAL WELLNESS VISIT, SUBSEQUENT: ICD-10-CM

## 2024-08-29 PROCEDURE — 3080F DIAST BP >= 90 MM HG: CPT | Performed by: FAMILY MEDICINE

## 2024-08-29 PROCEDURE — 3008F BODY MASS INDEX DOCD: CPT | Performed by: FAMILY MEDICINE

## 2024-08-29 PROCEDURE — 1159F MED LIST DOCD IN RCRD: CPT | Performed by: FAMILY MEDICINE

## 2024-08-29 PROCEDURE — 99213 OFFICE O/P EST LOW 20 MIN: CPT | Performed by: FAMILY MEDICINE

## 2024-08-29 PROCEDURE — 1170F FXNL STATUS ASSESSED: CPT | Performed by: FAMILY MEDICINE

## 2024-08-29 PROCEDURE — 3077F SYST BP >= 140 MM HG: CPT | Performed by: FAMILY MEDICINE

## 2024-08-29 PROCEDURE — 1160F RVW MEDS BY RX/DR IN RCRD: CPT | Performed by: FAMILY MEDICINE

## 2024-08-29 PROCEDURE — 1036F TOBACCO NON-USER: CPT | Performed by: FAMILY MEDICINE

## 2024-08-29 RX ORDER — METOPROLOL SUCCINATE 25 MG/1
25 TABLET, EXTENDED RELEASE ORAL EVERY EVENING
Qty: 90 TABLET | Refills: 1 | Status: SHIPPED | OUTPATIENT
Start: 2024-08-29

## 2024-08-29 ASSESSMENT — ACTIVITIES OF DAILY LIVING (ADL)
GROCERY_SHOPPING: INDEPENDENT
BATHING: INDEPENDENT
DRESSING: INDEPENDENT
TAKING_MEDICATION: INDEPENDENT
DOING_HOUSEWORK: INDEPENDENT
MANAGING_FINANCES: INDEPENDENT

## 2024-08-29 ASSESSMENT — ENCOUNTER SYMPTOMS
ALLERGIC/IMMUNOLOGIC NEGATIVE: 1
CARDIOVASCULAR NEGATIVE: 1
GASTROINTESTINAL NEGATIVE: 1
PSYCHIATRIC NEGATIVE: 1
EYES NEGATIVE: 1
CONSTITUTIONAL NEGATIVE: 1
NEUROLOGICAL NEGATIVE: 1
HEMATOLOGIC/LYMPHATIC NEGATIVE: 1
ENDOCRINE NEGATIVE: 1
MUSCULOSKELETAL NEGATIVE: 1
RESPIRATORY NEGATIVE: 1

## 2024-08-29 NOTE — PROGRESS NOTES
Zabrina Pereira is here for a follow-up on her hypertension and medication changes.  She was also scheduled for a annual wellness visit as well.  At her last office visit 2 months ago we had decreased her amlodipine to 5 mg daily Because of pedal edema and increased her dose of metoprolol as noted.  She continues on her other meds as noted.  Her home blood pressures have all been normal since then.  The last reading 136/83 yesterday and 135/75 131/80 previously.  She does have a history of whitecoat hypertension.  She had a basal cell carcinoma diagnosed on the tip of her nose and she is deciding between Mohs surgery or radiation therapy for this.  She has no other complaints at the present time    Patient ID: Kelli Ellis is a 74 y.o. female who presents for Medicare Annual Wellness Visit Subsequent and Follow-up:    Problem List Items Addressed This Visit    None     Past Medical History:   Diagnosis Date    Body mass index (BMI) 36.0-36.9, adult 09/28/2021    BMI 36.0-36.9,adult    Breast cancer (Multi)     Dysphonia 11/16/2020    Dysphonia    Encounter for screening for malignant neoplasm of colon     Screen for colon cancer    Obesity, unspecified 05/19/2022    Class 2 obesity with body mass index (BMI) of 36.0 to 36.9 in adult    Personal history of irradiation     Personal history of malignant neoplasm of breast     History of malignant neoplasm of female breast    Personal history of other diseases of the circulatory system     History of hypertension    Personal history of other endocrine, nutritional and metabolic disease 09/28/2021    History of obesity    Personal history of other endocrine, nutritional and metabolic disease     History of high cholesterol    Personal history of other specified conditions 09/28/2021    History of hoarseness      Past Surgical History:   Procedure Laterality Date    BREAST BIOPSY      BREAST LUMPECTOMY      MOLE REMOVAL Left     chest  10/25/2023    OTHER SURGICAL  HISTORY  08/01/2019    Hammer toe surgery    OTHER SURGICAL HISTORY  08/01/2019    Tubal ligation    OTHER SURGICAL HISTORY  05/19/2022    Mohs surgery    OTHER SURGICAL HISTORY  05/19/2022    Skin biopsy      Family History   Problem Relation Name Age of Onset    Other (cardiac disorder) Mother      Hyperlipidemia Mother      Hypertension Mother      Other (malignant neoplasm of vulva) Mother        Social History     Socioeconomic History    Marital status:      Spouse name: Not on file    Number of children: Not on file    Years of education: Not on file    Highest education level: Not on file   Occupational History    Not on file   Tobacco Use    Smoking status: Former     Types: Cigarettes    Smokeless tobacco: Never   Substance and Sexual Activity    Alcohol use: Yes     Alcohol/week: 2.0 standard drinks of alcohol     Types: 2 Standard drinks or equivalent per week    Drug use: Never    Sexual activity: Not on file   Other Topics Concern    Not on file   Social History Narrative    Not on file     Social Determinants of Health     Financial Resource Strain: Not on file   Food Insecurity: Not on file   Transportation Needs: Not on file   Physical Activity: Not on file   Stress: Not on file   Social Connections: Not on file   Intimate Partner Violence: Not on file   Housing Stability: Not on file      Chlorthalidone, Methylprednisolone, and Triamterene-hydrochlorothiazid   Current Outpatient Medications   Medication Sig Dispense Refill    acetaminophen (Tylenol 8 HOUR) 650 mg ER tablet Take 1 tablet (650 mg) by mouth 2 times a day.      amLODIPine (Norvasc) 5 mg tablet Take 1 tablet (5 mg) by mouth once daily. 90 tablet 1    carBAMazepine (Carbatrol) 100 mg 12 hr capsule TAKE 1 CAPSULE BY MOUTH EVERY 12 HOURS 180 capsule 1    gabapentin (Neurontin) 300 mg capsule TAKE 2 CAPSULES BY MOUTH 3 TIMES DAILY 360 capsule 1    lisinopril 20 mg tablet TAKE 1 TABLET BY MOUTH TWICE  DAILY 180 tablet 1    loratadine  (Claritin) 10 mg tablet Take 1 tablet (10 mg) by mouth once daily.      metoprolol succinate XL (Toprol-XL) 100 mg 24 hr tablet TAKE 1 TABLET BY MOUTH DAILY 90 tablet 1    metoprolol succinate XL (Toprol-XL) 25 mg 24 hr tablet Take 1 tablet (25 mg) by mouth once daily. Do not crush or chew. 90 tablet 1    multivitamin tablet Take 1 tablet by mouth once daily.      pravastatin (Pravachol) 40 mg tablet TAKE 1 TABLET BY MOUTH AT  BEDTIME 90 tablet 1     No current facility-administered medications for this visit.       Immunization History   Administered Date(s) Administered    Flu vaccine (IIV4), preservative free *Check age/dose* 11/15/2016, 11/01/2022    Flu vaccine, quadrivalent, high-dose, preservative free, age 65y+ (FLUZONE) 11/13/2023    Flu vaccine, trivalent, preservative free, HIGH-DOSE, age 65y+ (Fluzone) 10/18/2017, 09/24/2018, 10/01/2019, 10/01/2020    Influenza, Seasonal, Quadrivalent, Adjuvanted 10/11/2021    Influenza, Unspecified 12/13/2005, 12/11/2006, 10/17/2008, 12/01/2012, 11/01/2013, 09/01/2014, 10/06/2015, 10/01/2021    Moderna COVID-19 vaccine, Fall 2023, 12 yeasrs and older (50mcg/0.5mL) 01/18/2024    Moderna COVID-19 vaccine, bivalent, blue cap/gray label *Check age/dose* 10/17/2022    Moderna SARS-CoV-2 Vaccination 02/16/2021, 03/19/2021, 11/08/2021    Pneumococcal conjugate vaccine, 13-valent (PREVNAR 13) 10/18/2017    Pneumococcal polysaccharide vaccine, 23-valent, age 2 years and older (PNEUMOVAX 23) 09/24/2018    Tdap vaccine, age 7 year and older (BOOSTRIX, ADACEL) 08/14/2023    Zoster vaccine, recombinant, adult (SHINGRIX) 07/19/2021, 01/18/2022        Review of Systems   Constitutional: Negative.    HENT: Negative.     Eyes: Negative.    Respiratory: Negative.     Cardiovascular: Negative.    Gastrointestinal: Negative.    Endocrine: Negative.    Genitourinary: Negative.    Musculoskeletal: Negative.    Skin: Negative.    Allergic/Immunologic: Negative.    Neurological: Negative.     Hematological: Negative.    Psychiatric/Behavioral: Negative.     All other systems reviewed and are negative.       Vitals:    08/29/24 1106   BP: (!) 167/96   Pulse: 73   Temp: 36.7 °C (98 °F)     Vitals:    08/29/24 1106   Weight: 110 kg (242 lb)      Physical Exam  Constitutional:       General: She is not in acute distress.     Appearance: Normal appearance. She is obese.   Neurological:      Mental Status: She is alert.     Lower legs-patient states that the edema has improved since decreasing amlodipine to 5 mg daily.  There is some swelling of the left lower leg remaining.  Patient states that it is better in the morning and worse as the day progresses.    ASSESSMENT/PLAN: Hypertension stable with whitecoat component.  Continue current medications noted.  Continue home blood pressure checks    Pedal edema improved.  Call if any worsening.  Elevate legs when possible.    Recurrent basal cell cancers of the facial area.  Patient to decide between Mohs or radiation therapy for this.  We discussed that Mohs surgery is not a traditional approach for this.  I told the patient that I do not have much experience or knowledge of radiation therapy regarding basal cell cancer.    Hyperlipidemia improved with cholesterol 202 HDL 81 and LDL 89.  We discussed the importance of a low-fat diet.  Continue pravastatin daily.    Follow-up in 4 months and call as needed     Scribe Attestation  By signing my name below, I, Kinsey Yanez LPN, Scribe   attest that this documentation has been prepared under the direction and in the presence of Isaac Mendoza MD.

## 2024-09-24 NOTE — PROGRESS NOTES
Kelli Ellis female   1950 74 y.o.   [id number]      Chief Complaint  Annual mammogram and exam, history of left breast cancer    History Of Present Illness  Kelli Ellis is a very pleasant 74 y.o.  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 2022 Nataliia Brenner performed left breast Magseed partial mastectomy and sentinel lymph node biopsy (0/3). Final pathology revealed 1.3cm IDC with negative margins. She completed post-operative radiation on 1/10/2023. Mammaprint low risk luminal A. Per patient report, she was informed she could complete radiation or endocrine therapy. She was not told to do both. She declined endocrine therapy and opted for radiation. She has a remote history of right breast excisional biopsy, benign. She presents today for annual mammogram and exam. She denies any new masses or lumps.   Stage IA [id number]    BREAST IMAGING: 10/12/2023 Bilateral diagnostic mammogram, indicates BI-RADS Category 2.     REPRODUCTIVE HISTORY: menarche age 14, , first birth age 23, did not breastfeed, OCP's x 10 years, natural menopause age 50s, no HRT, scattered fibroglandular tissue    FAMILY CANCER HISTORY:   Mother: Vulvar cancer, 60s,  from MI    Surgical History  She has a past surgical history that includes Other surgical history (2019); Other surgical history (2019); Other surgical history (2022); Other surgical history (2022); Breast biopsy; Breast lumpectomy; and Mole removal (Left).     Social History  She reports that she has quit smoking. Her smoking use included cigarettes. She has never used smokeless tobacco. She reports current alcohol use of about 2.0 standard drinks of alcohol per week. She reports that she does not use drugs.    Family History  Family History   Problem Relation Name Age of Onset    Other (cardiac disorder) Mother Mom     Hyperlipidemia Mother Mom     Hypertension  Mother Mom     Other (malignant neoplasm of vulva) Mother Mom     Hypertension Brother Bill     Hypertension Brother Yordan         Allergies  Chlorthalidone, Methylprednisolone, and Triamterene-hydrochlorothiazid    Medications  Current Outpatient Medications   Medication Instructions    acetaminophen (Tylenol 8 HOUR) 650 mg ER tablet 1 tablet, 2 times daily    amLODIPine (NORVASC) 5 mg, oral, Daily    carBAMazepine (CARBATROL) 100 mg, oral, Every 12 hours    gabapentin (NEURONTIN) 600 mg, oral, 3 times daily    lisinopril 20 mg, oral, 2 times daily    loratadine (Claritin) 10 mg tablet 1 tablet, Daily    metoprolol succinate XL (TOPROL-XL) 100 mg, oral, Daily    metoprolol succinate XL (TOPROL-XL) 25 mg, oral, Every evening, Do not crush or chew.    multivitamin tablet 1 tablet, Daily    pravastatin (PRAVACHOL) 40 mg, oral, Nightly         REVIEW OF SYSTEMS    Constitutional:  Negative for appetite change, fatigue, fever and unexpected weight change.   HENT:  Negative for ear pain, hearing loss, nosebleeds, sore throat and trouble swallowing.    Eyes:  Negative for discharge, itching and visual disturbance.   Respiratory:  Negative for cough, chest tightness and shortness of breath.    Cardiovascular:  Negative for chest pain, palpitations and leg swelling.   Breast: as indicated in HPI  Gastrointestinal:  Negative for abdominal pain, constipation, diarrhea and nausea.   Endocrine: Negative for cold intolerance and heat intolerance.   Genitourinary:  Negative for dysuria, frequency, hematuria, pelvic pain and vaginal bleeding.   Musculoskeletal:  Negative for arthralgias, back pain, gait problem, joint swelling and myalgias.   Skin:  Negative for color change and rash.   Allergic/Immunologic: Negative for environmental allergies and food allergies.   Neurological:  Negative for dizziness, tremors, speech difficulty, weakness, numbness and headaches.   Hematological:  Does not bruise/bleed easily.    Psychiatric/Behavioral:  Negative for agitation, dysphoric mood and sleep disturbance. The patient is not nervous/anxious.         Past Medical History  She has a past medical history of Body mass index (BMI) 36.0-36.9, adult (09/28/2021), Breast cancer (Multi), Dysphonia (11/16/2020), Encounter for screening for malignant neoplasm of colon, Obesity, unspecified (05/19/2022), Personal history of irradiation, Personal history of malignant neoplasm of breast, Personal history of other diseases of the circulatory system, Personal history of other endocrine, nutritional and metabolic disease (09/28/2021), Personal history of other endocrine, nutritional and metabolic disease, and Personal history of other specified conditions (09/28/2021).     Physical Exam  Patient is alert and oriented x3 and in a relaxed and appropriate mood. Her gait is steady and hand grasps are equal. Sclera is clear. The breasts are nearly symmetrical. The tissue is soft without palpable abnormalities, discrete nodules or masses. The right breast has a well-healed excisional biopsy incision, superior medial quadrant. The left breast has a well-healed incision from previous benign skin lesion superior medial quadrant. The left breast has a well-healed partial circumareolar incision. The left axilla has a well-healed biopsy incision. The skin and nipples appear normal. There is no cervical, supraclavicular or axillary lymphadenopathy. Heart rate and rhythm normal, S1 and S2 appreciated. The lungs are clear to auscultation bilaterally. Abdomen is soft and non-tender.       Physical Exam  Chest:              Last Recorded Vitals  Vitals:    10/16/24 0916   BP: (!) 157/105   Pulse: 73   Resp: 16       Relevant Results   Time was spent discussing digital images of the radiology testing with the patient. I explained the results in depth, along with suggested explanation for follow up recommendations based on the testing results. BI-RADS Category  2    Imaging    Narrative & Impression   Interpreted By:  Carmella Baldwin,   STUDY:  BI MAMMO BILATERAL DIAGNOSTIC TOMOSYNTHESIS;  10/16/2024 10:23 am      ACCESSION NUMBER(S):  [id number]      ORDERING CLINICIAN:  SOBEIDA CAPELLAN      INDICATION:  Left lumpectomy with radiation. Benign right excisional biopsy.      ,C50.412 Malignant neoplasm of upper-outer quadrant of left female  breast,Z17.0 Estrogen receptor positive status (ER+)      COMPARISON:  10/12/2023, 10/11/2022      FINDINGS:  MAMMOGRAPHY: 2D and tomosynthesis images were reviewed at 1 mm slice  thickness.      Density:  There are scattered areas of fibroglandular density.      There is postsurgical scarring, skin retraction and surgical clips in  the central left breast from previous lumpectomy. There is left  axillary postoperative scarring and surgical clips. No suspicious  masses or calcifications are identified.      IMPRESSION:  Left post treatment changes. No mammographic evidence of malignancy.      BI-RADS CATEGORY:  BI-RADS Category:  2 Benign.  Recommendation:  Annual Screening.  Recommended Date:  1 Year.  Laterality:  Bilateral.         Assessment/Plan   Normal clinical exam and imaging, history of left breast cancer, declined endocrine therapy, no family history of breast cancer, scattered fibroglandular tissue    Plan: Return in one year for bilateral screening mammogram and office visit.     Patient Discussion/Summary  Your clinical examination and imaging are normal. Please return in one year for bilateral screening mammogram and office visit or sooner if you have any problems or concerns.     You can see your health information, review clinical summaries from office visits & test results online when you follow your health with MY  Chart, a personal health record. To sign up go to [email or web address]. If you need assistance with signing up or trouble getting into your account call [employer] Patient Line 24/7 at  [phone].    My office phone number is [phone] if you need to get in touch with me or have additional questions or concerns. Thank you for choosing [facility] and trusting me as your healthcare provider. I look forward to seeing you again at your next office visit. I am honored to be a provider on your health care team and I remain dedicated to helping you achieve your health goals.      Heather Gutierrez, APRN-CNP

## 2024-10-15 ENCOUNTER — APPOINTMENT (OUTPATIENT)
Dept: SURGICAL ONCOLOGY | Facility: HOSPITAL | Age: 74
End: 2024-10-15
Payer: MEDICARE

## 2024-10-16 ENCOUNTER — HOSPITAL ENCOUNTER (OUTPATIENT)
Dept: RADIOLOGY | Facility: HOSPITAL | Age: 74
Discharge: HOME | End: 2024-10-16
Payer: MEDICARE

## 2024-10-16 ENCOUNTER — OFFICE VISIT (OUTPATIENT)
Dept: SURGICAL ONCOLOGY | Facility: HOSPITAL | Age: 74
End: 2024-10-16
Payer: MEDICARE

## 2024-10-16 VITALS — WEIGHT: 243 LBS | HEIGHT: 66 IN | BODY MASS INDEX: 39.05 KG/M2

## 2024-10-16 VITALS
SYSTOLIC BLOOD PRESSURE: 157 MMHG | WEIGHT: 243 LBS | BODY MASS INDEX: 39.05 KG/M2 | HEART RATE: 73 BPM | HEIGHT: 66 IN | DIASTOLIC BLOOD PRESSURE: 105 MMHG | RESPIRATION RATE: 16 BRPM

## 2024-10-16 DIAGNOSIS — C50.412 MALIGNANT NEOPLASM OF UPPER-OUTER QUADRANT OF LEFT BREAST IN FEMALE, ESTROGEN RECEPTOR POSITIVE: ICD-10-CM

## 2024-10-16 DIAGNOSIS — Z08 ENCOUNTER FOR FOLLOW-UP SURVEILLANCE OF BREAST CANCER: Primary | ICD-10-CM

## 2024-10-16 DIAGNOSIS — Z85.3 ENCOUNTER FOR FOLLOW-UP SURVEILLANCE OF BREAST CANCER: Primary | ICD-10-CM

## 2024-10-16 DIAGNOSIS — Z17.0 MALIGNANT NEOPLASM OF UPPER-OUTER QUADRANT OF LEFT BREAST IN FEMALE, ESTROGEN RECEPTOR POSITIVE: ICD-10-CM

## 2024-10-16 DIAGNOSIS — Z12.31 ENCOUNTER FOR SCREENING MAMMOGRAM FOR MALIGNANT NEOPLASM OF BREAST: ICD-10-CM

## 2024-10-16 PROCEDURE — 1036F TOBACCO NON-USER: CPT | Performed by: NURSE PRACTITIONER

## 2024-10-16 PROCEDURE — 77066 DX MAMMO INCL CAD BI: CPT | Performed by: RADIOLOGY

## 2024-10-16 PROCEDURE — 3008F BODY MASS INDEX DOCD: CPT | Performed by: NURSE PRACTITIONER

## 2024-10-16 PROCEDURE — 3080F DIAST BP >= 90 MM HG: CPT | Performed by: NURSE PRACTITIONER

## 2024-10-16 PROCEDURE — 77062 BREAST TOMOSYNTHESIS BI: CPT

## 2024-10-16 PROCEDURE — 3077F SYST BP >= 140 MM HG: CPT | Performed by: NURSE PRACTITIONER

## 2024-10-16 PROCEDURE — 99213 OFFICE O/P EST LOW 20 MIN: CPT | Performed by: NURSE PRACTITIONER

## 2024-10-16 PROCEDURE — 1159F MED LIST DOCD IN RCRD: CPT | Performed by: NURSE PRACTITIONER

## 2024-10-16 PROCEDURE — G0279 TOMOSYNTHESIS, MAMMO: HCPCS | Performed by: RADIOLOGY

## 2024-10-16 NOTE — PATIENT INSTRUCTIONS
Your clinical examination and imaging are normal. Please return in one year for bilateral screening mammogram and office visit or sooner if you have any problems or concerns.     You can see your health information, review clinical summaries from office visits & test results online when you follow your health with MY  Chart, a personal health record. To sign up go to [email or web address]. If you need assistance with signing up or trouble getting into your account call [employer] Patient Line 24/7 at [phone].    My office phone number is [phone] if you need to get in touch with me or have additional questions or concerns. Thank you for choosing [facility] and trusting me as your healthcare provider. I look forward to seeing you again at your next office visit. I am honored to be a provider on your health care team and I remain dedicated to helping you achieve your health goals.

## 2024-10-25 DIAGNOSIS — G50.0 TRIGEMINAL NEURALGIA: ICD-10-CM

## 2024-10-25 DIAGNOSIS — I10 ESSENTIAL HYPERTENSION: ICD-10-CM

## 2024-10-29 RX ORDER — LISINOPRIL 20 MG/1
20 TABLET ORAL 2 TIMES DAILY
Qty: 180 TABLET | Refills: 1 | Status: SHIPPED | OUTPATIENT
Start: 2024-10-29

## 2024-10-29 RX ORDER — GABAPENTIN 300 MG/1
600 CAPSULE ORAL 3 TIMES DAILY
Qty: 180 CAPSULE | Refills: 1 | Status: SHIPPED | OUTPATIENT
Start: 2024-10-29

## 2024-10-29 RX ORDER — METOPROLOL SUCCINATE 100 MG/1
100 TABLET, EXTENDED RELEASE ORAL DAILY
Qty: 90 TABLET | Refills: 1 | Status: SHIPPED | OUTPATIENT
Start: 2024-10-29

## 2024-10-29 RX ORDER — CARBAMAZEPINE 100 MG/1
100 CAPSULE, EXTENDED RELEASE ORAL EVERY 12 HOURS
Qty: 180 CAPSULE | Refills: 1 | Status: SHIPPED | OUTPATIENT
Start: 2024-10-29

## 2024-12-04 DIAGNOSIS — G50.0 TRIGEMINAL NEURALGIA: ICD-10-CM

## 2024-12-04 DIAGNOSIS — I10 ESSENTIAL HYPERTENSION: ICD-10-CM

## 2024-12-04 DIAGNOSIS — E78.49 OTHER HYPERLIPIDEMIA: ICD-10-CM

## 2024-12-04 RX ORDER — METOPROLOL SUCCINATE 25 MG/1
25 TABLET, EXTENDED RELEASE ORAL EVERY EVENING
Qty: 90 TABLET | Refills: 3 | Status: SHIPPED | OUTPATIENT
Start: 2024-12-04

## 2024-12-04 RX ORDER — GABAPENTIN 300 MG/1
600 CAPSULE ORAL 3 TIMES DAILY
Qty: 360 CAPSULE | Refills: 0 | Status: SHIPPED | OUTPATIENT
Start: 2024-12-04

## 2024-12-04 RX ORDER — PRAVASTATIN SODIUM 40 MG/1
40 TABLET ORAL NIGHTLY
Qty: 90 TABLET | Refills: 3 | Status: SHIPPED | OUTPATIENT
Start: 2024-12-04

## 2025-01-08 ENCOUNTER — APPOINTMENT (OUTPATIENT)
Dept: PRIMARY CARE | Facility: CLINIC | Age: 75
End: 2025-01-08
Payer: MEDICARE

## 2025-01-08 VITALS
TEMPERATURE: 97.9 F | BODY MASS INDEX: 38.74 KG/M2 | OXYGEN SATURATION: 96 % | WEIGHT: 240 LBS | DIASTOLIC BLOOD PRESSURE: 84 MMHG | SYSTOLIC BLOOD PRESSURE: 166 MMHG | HEART RATE: 78 BPM

## 2025-01-08 DIAGNOSIS — Z87.891 FORMER SMOKER: ICD-10-CM

## 2025-01-08 DIAGNOSIS — I10 ESSENTIAL HYPERTENSION: ICD-10-CM

## 2025-01-08 DIAGNOSIS — Z79.899 ON CARBAMAZEPINE THERAPY: ICD-10-CM

## 2025-01-08 DIAGNOSIS — E66.812 CLASS 2 OBESITY WITH BODY MASS INDEX (BMI) OF 38.0 TO 38.9 IN ADULT, UNSPECIFIED OBESITY TYPE, UNSPECIFIED WHETHER SERIOUS COMORBIDITY PRESENT: ICD-10-CM

## 2025-01-08 DIAGNOSIS — E78.2 MIXED HYPERLIPIDEMIA: ICD-10-CM

## 2025-01-08 DIAGNOSIS — G50.0 TRIGEMINAL NEURALGIA: ICD-10-CM

## 2025-01-08 PROCEDURE — 1159F MED LIST DOCD IN RCRD: CPT | Performed by: FAMILY MEDICINE

## 2025-01-08 PROCEDURE — 99213 OFFICE O/P EST LOW 20 MIN: CPT | Performed by: FAMILY MEDICINE

## 2025-01-08 PROCEDURE — 1160F RVW MEDS BY RX/DR IN RCRD: CPT | Performed by: FAMILY MEDICINE

## 2025-01-08 PROCEDURE — 3079F DIAST BP 80-89 MM HG: CPT | Performed by: FAMILY MEDICINE

## 2025-01-08 PROCEDURE — 1126F AMNT PAIN NOTED NONE PRSNT: CPT | Performed by: FAMILY MEDICINE

## 2025-01-08 PROCEDURE — 3077F SYST BP >= 140 MM HG: CPT | Performed by: FAMILY MEDICINE

## 2025-01-08 PROCEDURE — 1036F TOBACCO NON-USER: CPT | Performed by: FAMILY MEDICINE

## 2025-01-08 ASSESSMENT — PAIN SCALES - GENERAL: PAINLEVEL_OUTOF10: 0-NO PAIN

## 2025-01-08 NOTE — PROGRESS NOTES
Zabrina Pereira is here for a follow-up on her hypertension.  She states that she has been feeling well.  She has been under a lot of stress recently.  Her home blood pressures have been rising and are now usually with systolics in the 140s and 150s.  Previously they had been much better.  She continues on her medications as noted.  Her leg swelling has been stable.  Her trigeminal neuralgia symptoms are also stable with the use of carbamazepine and gabapentin as noted.  She had been on hydrochlorothiazide and chlorthalidone in the past which were discontinued because of electrolyte changes.  She has no other complaints at the present time.    Patient ID: Kelli Ellis is a 74 y.o. female who presents for Follow-up (No concerns) and Hypertension:    Problem List Items Addressed This Visit    None     Past Medical History:   Diagnosis Date    Body mass index (BMI) 36.0-36.9, adult 09/28/2021    BMI 36.0-36.9,adult    Breast cancer (Multi)     Dysphonia 11/16/2020    Dysphonia    Encounter for screening for malignant neoplasm of colon     Screen for colon cancer    Obesity, unspecified 05/19/2022    Class 2 obesity with body mass index (BMI) of 36.0 to 36.9 in adult    Personal history of irradiation     Personal history of malignant neoplasm of breast     History of malignant neoplasm of female breast    Personal history of other diseases of the circulatory system     History of hypertension    Personal history of other endocrine, nutritional and metabolic disease 09/28/2021    History of obesity    Personal history of other endocrine, nutritional and metabolic disease     History of high cholesterol    Personal history of other specified conditions 09/28/2021    History of hoarseness      Past Surgical History:   Procedure Laterality Date    BREAST BIOPSY      BREAST LUMPECTOMY      MOLE REMOVAL Left     chest  10/25/2023    OTHER SURGICAL HISTORY  08/01/2019    Hammer toe surgery    OTHER SURGICAL HISTORY   08/01/2019    Tubal ligation    OTHER SURGICAL HISTORY  05/19/2022    Mohs surgery    OTHER SURGICAL HISTORY  05/19/2022    Skin biopsy      Family History   Problem Relation Name Age of Onset    Other (cardiac disorder) Mother Mom     Hyperlipidemia Mother Mom     Hypertension Mother Mom     Other (malignant neoplasm of vulva) Mother Mom     Hypertension Brother Bill     Hypertension Brother Yordan       Social History     Socioeconomic History    Marital status:      Spouse name: Not on file    Number of children: Not on file    Years of education: Not on file    Highest education level: Not on file   Occupational History    Not on file   Tobacco Use    Smoking status: Former     Current packs/day: 0.00     Types: Cigarettes    Smokeless tobacco: Never    Tobacco comments:     Have not smoked for over 25 years   Vaping Use    Vaping status: Never Used   Substance and Sexual Activity    Alcohol use: Yes     Alcohol/week: 2.0 standard drinks of alcohol     Types: 2 Standard drinks or equivalent per week     Comment: Prefer wine, some weeks, none, some weeks a few    Drug use: Never    Sexual activity: Not Currently     Partners: Male     Birth control/protection: None     Comment: Age - 74   Other Topics Concern    Not on file   Social History Narrative    Not on file     Social Drivers of Health     Financial Resource Strain: Not on file   Food Insecurity: Not on file   Transportation Needs: Not on file   Physical Activity: Not on file   Stress: Not on file   Social Connections: Not on file   Intimate Partner Violence: Not on file   Housing Stability: Not on file      Chlorthalidone, Methylprednisolone, and Triamterene-hydrochlorothiazid   Current Outpatient Medications   Medication Sig Dispense Refill    carBAMazepine (Carbatrol) 100 mg 12 hr capsule TAKE 1 CAPSULE BY MOUTH EVERY 12 HOURS 180 capsule 1    lisinopril 20 mg tablet TAKE 1 TABLET BY MOUTH TWICE  DAILY 180 tablet 1    metoprolol succinate XL  (Toprol-XL) 100 mg 24 hr tablet TAKE 1 TABLET BY MOUTH DAILY 90 tablet 1    acetaminophen (Tylenol 8 HOUR) 650 mg ER tablet Take 1 tablet (650 mg) by mouth 2 times a day.      amLODIPine (Norvasc) 5 mg tablet Take 1 tablet (5 mg) by mouth once daily. 90 tablet 1    gabapentin (Neurontin) 300 mg capsule TAKE 2 CAPSULES BY MOUTH 3 TIMES DAILY 360 capsule 0    loratadine (Claritin) 10 mg tablet Take 1 tablet (10 mg) by mouth once daily.      metoprolol succinate XL (Toprol-XL) 25 mg 24 hr tablet Take 1 tablet (25 mg) by mouth once daily in the evening. Do not crush or chew. 90 tablet 3    multivitamin tablet Take 1 tablet by mouth once daily.      pravastatin (Pravachol) 40 mg tablet TAKE 1 TABLET BY MOUTH AT  BEDTIME 90 tablet 3     No current facility-administered medications for this visit.       Immunization History   Administered Date(s) Administered    Flu vaccine (IIV4), preservative free *Check age/dose* 11/15/2016, 11/01/2022    Flu vaccine, quadrivalent, high-dose, preservative free, age 65y+ (FLUZONE) 11/13/2023    Flu vaccine, trivalent, preservative free, HIGH-DOSE, age 65y+ (Fluzone) 10/18/2017, 09/24/2018, 10/01/2019, 10/01/2020    Influenza, Seasonal, Quadrivalent, Adjuvanted 10/11/2021    Influenza, Unspecified 12/13/2005, 12/11/2006, 10/17/2008, 12/01/2012, 11/01/2013, 09/01/2014, 10/06/2015, 10/01/2021    Moderna COVID-19 vaccine, 12 years and older (50mcg/0.5mL)(Spikevax) 01/18/2024    Moderna COVID-19 vaccine, bivalent, blue cap/gray label *Check age/dose* 10/17/2022    Moderna SARS-CoV-2 Vaccination 02/16/2021, 03/19/2021, 11/08/2021    Pneumococcal conjugate vaccine, 13-valent (PREVNAR 13) 10/18/2017    Pneumococcal polysaccharide vaccine, 23-valent, age 2 years and older (PNEUMOVAX 23) 09/24/2018    Tdap vaccine, age 7 year and older (BOOSTRIX, ADACEL) 08/14/2023    Zoster vaccine, recombinant, adult (SHINGRIX) 07/19/2021, 01/18/2022        Review of Systems   Constitutional: Negative.    HENT:  Negative.     Eyes: Negative.    Respiratory: Negative.     Cardiovascular: Negative.    Gastrointestinal: Negative.    Endocrine: Negative.    Genitourinary: Negative.    Musculoskeletal: Negative.    Skin: Negative.    Allergic/Immunologic: Negative.    Neurological: Negative.    Hematological: Negative.    Psychiatric/Behavioral: Negative.     All other systems reviewed and are negative.       Vitals:    01/08/25 1119   BP: 166/84   Pulse: 78   Temp:    SpO2: 96%     Vitals:    01/08/25 1118   Weight: 109 kg (240 lb)      Physical Exam  Constitutional:       General: She is not in acute distress.     Appearance: Normal appearance.   Cardiovascular:      Rate and Rhythm: Normal rate and regular rhythm.      Pulses: Normal pulses.      Heart sounds: Normal heart sounds. No murmur heard.     No friction rub. No gallop.   Pulmonary:      Effort: Pulmonary effort is normal. No respiratory distress.      Breath sounds: Normal breath sounds. No wheezing or rales.   Neurological:      General: No focal deficit present.      Mental Status: She is alert and oriented to person, place, and time. Mental status is at baseline.          ASSESSMENT/PLAN: Hypertension with worsening control.  Discontinue amlodipine.  Higher doses of amlodipine in the past have caused worsening lower leg swelling.  Begin HCTZ 12.5 mg daily.  She does not recall any actual adverse events but did have some electrolyte abnormalities in the past.  This will be monitored closely.  Check CMP in 4 weeks.  Continue to monitor home blood pressures closely.  Call or send a listing of home blood pressures in 1 month.  Continue lisinopril and metoprolol as noted    Trigeminal neuralgia stable.  Continue current meds noted.  Check carbamazepine level    Hyperlipidemia.  Check lipid profile    History of breast cancer.  Follows up with breast surgery for annual checks and mammograms    Follow-up pending above and call as needed     Scribe Attestation  By  signing my name below, I, Kinsey Yanez LPN, Scribe   attest that this documentation has been prepared under the direction and in the presence of Isaac Mendoza MD.

## 2025-01-08 NOTE — PATIENT INSTRUCTIONS
Stop amlodipine  Begin hydrochlorothiazide 12.5 mg daily  Provide blood pressure readings via MyChart after beginning new medication  Return 4 months

## 2025-01-09 RX ORDER — HYDROCHLOROTHIAZIDE 12.5 MG/1
12.5 TABLET ORAL DAILY
Qty: 90 TABLET | Refills: 1 | Status: SHIPPED | OUTPATIENT
Start: 2025-01-09 | End: 2025-07-08

## 2025-01-09 ASSESSMENT — ENCOUNTER SYMPTOMS
HEMATOLOGIC/LYMPHATIC NEGATIVE: 1
EYES NEGATIVE: 1
CONSTITUTIONAL NEGATIVE: 1
ENDOCRINE NEGATIVE: 1
GASTROINTESTINAL NEGATIVE: 1
RESPIRATORY NEGATIVE: 1
PSYCHIATRIC NEGATIVE: 1
CARDIOVASCULAR NEGATIVE: 1
MUSCULOSKELETAL NEGATIVE: 1
ALLERGIC/IMMUNOLOGIC NEGATIVE: 1
NEUROLOGICAL NEGATIVE: 1

## 2025-01-26 DIAGNOSIS — G50.0 TRIGEMINAL NEURALGIA: ICD-10-CM

## 2025-01-28 RX ORDER — GABAPENTIN 300 MG/1
600 CAPSULE ORAL 3 TIMES DAILY
Qty: 360 CAPSULE | Refills: 0 | Status: SHIPPED | OUTPATIENT
Start: 2025-01-28

## 2025-03-06 DIAGNOSIS — G50.0 TRIGEMINAL NEURALGIA: ICD-10-CM

## 2025-03-06 DIAGNOSIS — I10 ESSENTIAL HYPERTENSION: ICD-10-CM

## 2025-03-08 RX ORDER — METOPROLOL SUCCINATE 100 MG/1
100 TABLET, EXTENDED RELEASE ORAL DAILY
Qty: 90 TABLET | Refills: 1 | Status: SHIPPED | OUTPATIENT
Start: 2025-03-08

## 2025-03-08 RX ORDER — LISINOPRIL 20 MG/1
20 TABLET ORAL 2 TIMES DAILY
Qty: 180 TABLET | Refills: 1 | Status: SHIPPED | OUTPATIENT
Start: 2025-03-08

## 2025-03-08 RX ORDER — CARBAMAZEPINE 100 MG/1
100 CAPSULE, EXTENDED RELEASE ORAL EVERY 12 HOURS
Qty: 180 CAPSULE | Refills: 1 | Status: SHIPPED | OUTPATIENT
Start: 2025-03-08

## 2025-03-12 DIAGNOSIS — I10 ESSENTIAL HYPERTENSION: Primary | ICD-10-CM

## 2025-03-13 LAB
ALBUMIN SERPL-MCNC: 4.6 G/DL (ref 3.6–5.1)
ALP SERPL-CCNC: 65 U/L (ref 37–153)
ALT SERPL-CCNC: 11 U/L (ref 6–29)
ANION GAP SERPL CALCULATED.4IONS-SCNC: 7 MMOL/L (CALC) (ref 7–17)
AST SERPL-CCNC: 16 U/L (ref 10–35)
BILIRUB SERPL-MCNC: 0.5 MG/DL (ref 0.2–1.2)
BUN SERPL-MCNC: 14 MG/DL (ref 7–25)
CALCIUM SERPL-MCNC: 9.7 MG/DL (ref 8.6–10.4)
CARBAMAZEPINE SERPL-MCNC: 4.3 MG/L (ref 4–12)
CHLORIDE SERPL-SCNC: 94 MMOL/L (ref 98–110)
CO2 SERPL-SCNC: 31 MMOL/L (ref 20–32)
CREAT SERPL-MCNC: 0.56 MG/DL (ref 0.6–1)
EGFRCR SERPLBLD CKD-EPI 2021: 97 ML/MIN/1.73M2
GLUCOSE SERPL-MCNC: 101 MG/DL (ref 65–99)
POTASSIUM SERPL-SCNC: 4.4 MMOL/L (ref 3.5–5.3)
PROT SERPL-MCNC: 7 G/DL (ref 6.1–8.1)
SODIUM SERPL-SCNC: 132 MMOL/L (ref 135–146)

## 2025-04-01 LAB
ANION GAP SERPL CALCULATED.4IONS-SCNC: 12 MMOL/L (CALC) (ref 7–17)
BUN SERPL-MCNC: 9 MG/DL (ref 7–25)
BUN/CREAT SERPL: 16 (CALC) (ref 6–22)
CALCIUM SERPL-MCNC: 9.5 MG/DL (ref 8.6–10.4)
CHLORIDE SERPL-SCNC: 92 MMOL/L (ref 98–110)
CO2 SERPL-SCNC: 26 MMOL/L (ref 20–32)
CREAT SERPL-MCNC: 0.55 MG/DL (ref 0.6–1)
EGFRCR SERPLBLD CKD-EPI 2021: 97 ML/MIN/1.73M2
GLUCOSE SERPL-MCNC: 100 MG/DL (ref 65–99)
POTASSIUM SERPL-SCNC: 4.1 MMOL/L (ref 3.5–5.3)
SODIUM SERPL-SCNC: 130 MMOL/L (ref 135–146)

## 2025-04-03 ENCOUNTER — APPOINTMENT (OUTPATIENT)
Dept: PRIMARY CARE | Facility: CLINIC | Age: 75
End: 2025-04-03
Payer: MEDICARE

## 2025-04-03 VITALS
SYSTOLIC BLOOD PRESSURE: 164 MMHG | BODY MASS INDEX: 38.58 KG/M2 | TEMPERATURE: 97.9 F | HEART RATE: 67 BPM | DIASTOLIC BLOOD PRESSURE: 87 MMHG | WEIGHT: 239 LBS | OXYGEN SATURATION: 97 %

## 2025-04-03 DIAGNOSIS — I10 ESSENTIAL HYPERTENSION: ICD-10-CM

## 2025-04-03 DIAGNOSIS — E66.812 CLASS 2 OBESITY WITH BODY MASS INDEX (BMI) OF 38.0 TO 38.9 IN ADULT, UNSPECIFIED OBESITY TYPE, UNSPECIFIED WHETHER SERIOUS COMORBIDITY PRESENT: ICD-10-CM

## 2025-04-03 DIAGNOSIS — R60.0 EDEMA OF LEFT LOWER EXTREMITY: ICD-10-CM

## 2025-04-03 DIAGNOSIS — Z87.891 FORMER SMOKER: ICD-10-CM

## 2025-04-03 PROCEDURE — 99213 OFFICE O/P EST LOW 20 MIN: CPT | Performed by: FAMILY MEDICINE

## 2025-04-03 PROCEDURE — 3079F DIAST BP 80-89 MM HG: CPT | Performed by: FAMILY MEDICINE

## 2025-04-03 PROCEDURE — 1160F RVW MEDS BY RX/DR IN RCRD: CPT | Performed by: FAMILY MEDICINE

## 2025-04-03 PROCEDURE — 3077F SYST BP >= 140 MM HG: CPT | Performed by: FAMILY MEDICINE

## 2025-04-03 PROCEDURE — 1159F MED LIST DOCD IN RCRD: CPT | Performed by: FAMILY MEDICINE

## 2025-04-03 PROCEDURE — 1036F TOBACCO NON-USER: CPT | Performed by: FAMILY MEDICINE

## 2025-04-03 RX ORDER — AMLODIPINE BESYLATE 5 MG/1
5 TABLET ORAL DAILY
Qty: 90 TABLET | Refills: 3 | Status: SHIPPED | OUTPATIENT
Start: 2025-04-03

## 2025-04-03 ASSESSMENT — PATIENT HEALTH QUESTIONNAIRE - PHQ9
2. FEELING DOWN, DEPRESSED OR HOPELESS: NOT AT ALL
1. LITTLE INTEREST OR PLEASURE IN DOING THINGS: NOT AT ALL
SUM OF ALL RESPONSES TO PHQ9 QUESTIONS 1 AND 2: 0

## 2025-04-03 ASSESSMENT — ENCOUNTER SYMPTOMS
FREQUENCY: 1
LIGHT-HEADEDNESS: 1

## 2025-04-03 NOTE — PROGRESS NOTES
Zabrina Pereira is here for follow-up on hypertension.  At her last office visit almost 3 months ago we had discontinued her amlodipine and begun HCTZ 12.5 mg daily.  With the HCTZ she has been noticing frequent urination and occasional orthostatic lightheadedness.  She began to notice some swelling in her left lower leg about 2 weeks ago.  She denies any injury and she has had no prolonged immobilization.  The right leg has been normal.  She did not have any swelling with lower doses of amlodipine.  She otherwise has been feeling well and has no other complaints.    Patient ID: Kelli Ellis is a 74 y.o. female who presents for Hypertension and Follow-up:    Problem List Items Addressed This Visit    None     Past Medical History:   Diagnosis Date    Body mass index (BMI) 36.0-36.9, adult 09/28/2021    BMI 36.0-36.9,adult    Breast cancer     Dysphonia 11/16/2020    Dysphonia    Encounter for screening for malignant neoplasm of colon     Screen for colon cancer    Obesity, unspecified 05/19/2022    Class 2 obesity with body mass index (BMI) of 36.0 to 36.9 in adult    Personal history of irradiation     Personal history of malignant neoplasm of breast     History of malignant neoplasm of female breast    Personal history of other diseases of the circulatory system     History of hypertension    Personal history of other endocrine, nutritional and metabolic disease 09/28/2021    History of obesity    Personal history of other endocrine, nutritional and metabolic disease     History of high cholesterol    Personal history of other specified conditions 09/28/2021    History of hoarseness      Past Surgical History:   Procedure Laterality Date    BREAST BIOPSY      BREAST LUMPECTOMY      MOLE REMOVAL Left     chest  10/25/2023    OTHER SURGICAL HISTORY  08/01/2019    Hammer toe surgery    OTHER SURGICAL HISTORY  08/01/2019    Tubal ligation    OTHER SURGICAL HISTORY  05/19/2022    Mohs surgery    OTHER SURGICAL HISTORY   05/19/2022    Skin biopsy      Family History   Problem Relation Name Age of Onset    Other (cardiac disorder) Mother Mom     Hyperlipidemia Mother Mom     Hypertension Mother Mom     Other (malignant neoplasm of vulva) Mother Mom     Hypertension Brother Bill     Hypertension Brother Yordan       Social History     Socioeconomic History    Marital status:      Spouse name: Not on file    Number of children: Not on file    Years of education: Not on file    Highest education level: Not on file   Occupational History    Not on file   Tobacco Use    Smoking status: Former     Current packs/day: 0.00     Types: Cigarettes    Smokeless tobacco: Never    Tobacco comments:     Have not smoked for over 25 years   Vaping Use    Vaping status: Never Used   Substance and Sexual Activity    Alcohol use: Yes     Alcohol/week: 2.0 standard drinks of alcohol     Types: 2 Standard drinks or equivalent per week     Comment: Prefer wine, some weeks, none, some weeks a few    Drug use: Never    Sexual activity: Not Currently     Partners: Male     Birth control/protection: None     Comment: Age - 74   Other Topics Concern    Not on file   Social History Narrative    Not on file     Social Drivers of Health     Financial Resource Strain: Not on file   Food Insecurity: Not on file   Transportation Needs: Not on file   Physical Activity: Not on file   Stress: Not on file   Social Connections: Not on file   Intimate Partner Violence: Not on file   Housing Stability: Not on file      Chlorthalidone, Methylprednisolone, and Triamterene-hydrochlorothiazid   Current Outpatient Medications   Medication Sig Dispense Refill    acetaminophen (Tylenol 8 HOUR) 650 mg ER tablet Take 1 tablet (650 mg) by mouth 2 times a day.      carBAMazepine (Carbatrol) 100 mg 12 hr capsule TAKE 1 CAPSULE BY MOUTH EVERY 12 HOURS 180 capsule 1    gabapentin (Neurontin) 300 mg capsule TAKE 2 CAPSULES BY MOUTH 3 TIMES DAILY 360 capsule 0    hydroCHLOROthiazide  (Microzide) 12.5 mg tablet Take 1 tablet (12.5 mg) by mouth once daily. 90 tablet 1    lisinopril 20 mg tablet TAKE 1 TABLET BY MOUTH TWICE  DAILY 180 tablet 1    loratadine (Claritin) 10 mg tablet Take 1 tablet (10 mg) by mouth once daily.      metoprolol succinate XL (Toprol-XL) 100 mg 24 hr tablet TAKE 1 TABLET BY MOUTH DAILY 90 tablet 1    metoprolol succinate XL (Toprol-XL) 25 mg 24 hr tablet Take 1 tablet (25 mg) by mouth once daily in the evening. Do not crush or chew. 90 tablet 3    pravastatin (Pravachol) 40 mg tablet TAKE 1 TABLET BY MOUTH AT  BEDTIME 90 tablet 3    multivitamin tablet Take 1 tablet by mouth once daily. (Patient not taking: Reported on 4/3/2025)       No current facility-administered medications for this visit.       Immunization History   Administered Date(s) Administered    Flu vaccine (IIV4), preservative free *Check age/dose* 11/15/2016, 11/01/2022    Flu vaccine, quadrivalent, high-dose, preservative free, age 65y+ (FLUZONE) 11/13/2023    Flu vaccine, trivalent, preservative free, HIGH-DOSE, age 65y+ (Fluzone) 10/18/2017, 09/24/2018, 10/01/2019, 10/01/2020    Influenza, Seasonal, Quadrivalent, Adjuvanted 10/11/2021    Influenza, Unspecified 12/13/2005, 12/11/2006, 10/17/2008, 12/01/2012, 11/01/2013, 09/01/2014, 10/06/2015, 10/01/2021    Influenza, trivalent, adjuvanted 10/29/2024    Moderna COVID-19 vaccine, 12 years and older (50mcg/0.5mL)(Spikevax) 01/18/2024, 12/27/2024    Moderna COVID-19 vaccine, bivalent, blue cap/gray label *Check age/dose* 10/17/2022    Moderna SARS-CoV-2 Vaccination 02/16/2021, 03/19/2021, 11/08/2021    Pneumococcal conjugate vaccine, 13-valent (PREVNAR 13) 10/18/2017    Pneumococcal polysaccharide vaccine, 23-valent, age 2 years and older (PNEUMOVAX 23) 09/24/2018    Tdap vaccine, age 7 year and older (BOOSTRIX, ADACEL) 08/14/2023    Zoster vaccine, recombinant, adult (SHINGRIX) 07/19/2021, 01/18/2022        Review of Systems   Cardiovascular:  Positive for  leg swelling.   Genitourinary:  Positive for frequency.   Neurological:  Positive for light-headedness.   All other systems reviewed and are negative.       Vitals:    04/03/25 1418   BP: 164/87   Pulse: 67   Temp: 36.6 °C (97.9 °F)   SpO2: 97%     Vitals:    04/03/25 1418   Weight: 108 kg (239 lb)      Physical Exam  Constitutional:       General: She is not in acute distress.     Appearance: Normal appearance.   Neurological:      Mental Status: She is alert.     Right lower leg-normal with minimal edema.  Left lower leg there is 2+ pitting edema of the left lower leg.  There is no calf tenderness induration or redness or warmth.    ASSESSMENT/PLAN: Hypertension with poor control.  The majority of her home blood pressure readings are with systolics above 150 and occasional diastolics above 90.  Discontinue HCTZ because of side effects.  Resume amlodipine 5 mg daily.  She understands potential for edema with amlodipine but this has not occurred with the lower dose.  Continue other meds for now as noted.  Continue to monitor home blood pressures    Nonspecific left lower leg edema.  Schedule for left leg venous duplex scan ASAP.  Call if any worsening.    Call with blood pressure readings in 2 weeks.  Follow-up in 4 to 6 weeks and call as needed       Scribe Attestation  By signing my name below, I, Kinsey Yanez LPN, Scribe   attest that this documentation has been prepared under the direction and in the presence of Isaac Mendoza MD.

## 2025-04-03 NOTE — PATIENT INSTRUCTIONS
Schedule for venous duplex  Resume amlodipine 5 mg daily  Continue to monitor home blood pressures

## 2025-04-09 ENCOUNTER — HOSPITAL ENCOUNTER (OUTPATIENT)
Dept: CARDIOLOGY | Facility: HOSPITAL | Age: 75
Discharge: HOME | End: 2025-04-09
Payer: MEDICARE

## 2025-04-09 DIAGNOSIS — R60.0 EDEMA OF LEFT LOWER EXTREMITY: ICD-10-CM

## 2025-04-09 PROCEDURE — 93971 EXTREMITY STUDY: CPT

## 2025-04-09 PROCEDURE — 93971 EXTREMITY STUDY: CPT | Performed by: INTERNAL MEDICINE

## 2025-04-20 DIAGNOSIS — G50.0 TRIGEMINAL NEURALGIA: ICD-10-CM

## 2025-04-23 RX ORDER — GABAPENTIN 300 MG/1
600 CAPSULE ORAL 3 TIMES DAILY
Qty: 360 CAPSULE | Refills: 0 | Status: SHIPPED | OUTPATIENT
Start: 2025-04-23

## 2025-05-12 DIAGNOSIS — I10 ESSENTIAL HYPERTENSION: ICD-10-CM

## 2025-05-14 ENCOUNTER — APPOINTMENT (OUTPATIENT)
Dept: PRIMARY CARE | Facility: CLINIC | Age: 75
End: 2025-05-14
Payer: MEDICARE

## 2025-05-14 VITALS
DIASTOLIC BLOOD PRESSURE: 100 MMHG | HEIGHT: 66 IN | BODY MASS INDEX: 38.18 KG/M2 | SYSTOLIC BLOOD PRESSURE: 159 MMHG | TEMPERATURE: 97.3 F | HEART RATE: 91 BPM | WEIGHT: 237.6 LBS

## 2025-05-14 DIAGNOSIS — Z00.00 MEDICARE ANNUAL WELLNESS VISIT, SUBSEQUENT: ICD-10-CM

## 2025-05-14 DIAGNOSIS — E66.812 CLASS 2 OBESITY WITH BODY MASS INDEX (BMI) OF 38.0 TO 38.9 IN ADULT, UNSPECIFIED OBESITY TYPE, UNSPECIFIED WHETHER SERIOUS COMORBIDITY PRESENT: ICD-10-CM

## 2025-05-14 DIAGNOSIS — G50.0 TRIGEMINAL NEURALGIA: ICD-10-CM

## 2025-05-14 DIAGNOSIS — E78.2 MIXED HYPERLIPIDEMIA: ICD-10-CM

## 2025-05-14 DIAGNOSIS — Z87.891 FORMER SMOKER: ICD-10-CM

## 2025-05-14 DIAGNOSIS — I10 ESSENTIAL HYPERTENSION: ICD-10-CM

## 2025-05-14 PROCEDURE — 3077F SYST BP >= 140 MM HG: CPT | Performed by: FAMILY MEDICINE

## 2025-05-14 PROCEDURE — 99213 OFFICE O/P EST LOW 20 MIN: CPT | Performed by: FAMILY MEDICINE

## 2025-05-14 PROCEDURE — 1159F MED LIST DOCD IN RCRD: CPT | Performed by: FAMILY MEDICINE

## 2025-05-14 PROCEDURE — 3008F BODY MASS INDEX DOCD: CPT | Performed by: FAMILY MEDICINE

## 2025-05-14 PROCEDURE — 1036F TOBACCO NON-USER: CPT | Performed by: FAMILY MEDICINE

## 2025-05-14 PROCEDURE — 1158F ADVNC CARE PLAN TLK DOCD: CPT | Performed by: FAMILY MEDICINE

## 2025-05-14 PROCEDURE — 1123F ACP DISCUSS/DSCN MKR DOCD: CPT | Performed by: FAMILY MEDICINE

## 2025-05-14 PROCEDURE — 3080F DIAST BP >= 90 MM HG: CPT | Performed by: FAMILY MEDICINE

## 2025-05-14 PROCEDURE — 1170F FXNL STATUS ASSESSED: CPT | Performed by: FAMILY MEDICINE

## 2025-05-14 PROCEDURE — 1160F RVW MEDS BY RX/DR IN RCRD: CPT | Performed by: FAMILY MEDICINE

## 2025-05-14 RX ORDER — GABAPENTIN 300 MG/1
600 CAPSULE ORAL 3 TIMES DAILY
Qty: 450 CAPSULE | Refills: 0 | Status: SHIPPED | OUTPATIENT
Start: 2025-05-14

## 2025-05-14 RX ORDER — HYDROCHLOROTHIAZIDE 12.5 MG/1
12.5 TABLET ORAL DAILY
Refills: 0 | OUTPATIENT
Start: 2025-05-14

## 2025-05-14 ASSESSMENT — ACTIVITIES OF DAILY LIVING (ADL)
MANAGING_FINANCES: INDEPENDENT
DOING_HOUSEWORK: INDEPENDENT
BATHING: INDEPENDENT
GROCERY_SHOPPING: INDEPENDENT
DRESSING: INDEPENDENT
TAKING_MEDICATION: INDEPENDENT

## 2025-05-14 ASSESSMENT — ENCOUNTER SYMPTOMS
MUSCULOSKELETAL NEGATIVE: 1
CONSTITUTIONAL NEGATIVE: 1
NEUROLOGICAL NEGATIVE: 1
HEMATOLOGIC/LYMPHATIC NEGATIVE: 1
RESPIRATORY NEGATIVE: 1
GASTROINTESTINAL NEGATIVE: 1
ALLERGIC/IMMUNOLOGIC NEGATIVE: 1
ENDOCRINE NEGATIVE: 1
PSYCHIATRIC NEGATIVE: 1
CARDIOVASCULAR NEGATIVE: 1
EYES NEGATIVE: 1

## 2025-05-14 NOTE — PROGRESS NOTES
Zabrina Pereira is here for a follow-up on her hypertension.  She is not here for a annual wellness visit.  Her home blood pressures for the month of April have mostly been elevated.  She has occasional normal readings.  She continues on amlodipine lisinopril and metoprolol as noted.  Her HCTZ had been discontinued because of lightheadedness and frequent urination.  She has been on various blood pressure medication combinations in the past.  She is trying to lose weight and monitors the salt in her diet.  She quit smoking many years ago.  She drinks 2 alcoholic beverages per week.        Patient ID: Kelli Ellis is a 74 y.o. female who presents for Medicare Annual Wellness Visit Subsequent (awv):    Problem List Items Addressed This Visit       Essential hypertension    Relevant Orders    Referral to Nephrology    Hyperlipidemia    Trigeminal neuralgia    Relevant Medications    gabapentin (Neurontin) 300 mg capsule    Former smoker     Other Visit Diagnoses         Medicare annual wellness visit, subsequent          Class 2 obesity with body mass index (BMI) of 38.0 to 38.9 in adult, unspecified obesity type, unspecified whether serious comorbidity present               Medical History[1]   Surgical History[2]   Family History[3]   Social History     Socioeconomic History    Marital status:      Spouse name: Not on file    Number of children: Not on file    Years of education: Not on file    Highest education level: Not on file   Occupational History    Not on file   Tobacco Use    Smoking status: Former     Types: Cigarettes     Start date:      Quit date:      Years since quittin.4    Smokeless tobacco: Never    Tobacco comments:     Have not smoked for over 25 years   Vaping Use    Vaping status: Never Used   Substance and Sexual Activity    Alcohol use: Yes     Alcohol/week: 2.0 standard drinks of alcohol     Types: 2 Standard drinks or equivalent per week     Comment: Prefer wine, some  weeks, none, some weeks a few    Drug use: Never    Sexual activity: Not Currently     Partners: Male     Birth control/protection: None     Comment: Age - 74   Other Topics Concern    Not on file   Social History Narrative    Not on file     Social Drivers of Health     Financial Resource Strain: Not on file   Food Insecurity: Not on file   Transportation Needs: Not on file   Physical Activity: Not on file   Stress: Not on file   Social Connections: Not on file   Intimate Partner Violence: Not on file   Housing Stability: Not on file      Chlorthalidone, Methylprednisolone, and Triamterene-hydrochlorothiazid   Current Medications[4]    Immunization History   Administered Date(s) Administered    Flu vaccine (IIV4), preservative free *Check age/dose* 11/15/2016, 11/01/2022    Flu vaccine, quadrivalent, high-dose, preservative free, age 65y+ (FLUZONE) 11/13/2023    Flu vaccine, trivalent, preservative free, HIGH-DOSE, age 65y+ (Fluzone) 10/18/2017, 09/24/2018, 10/01/2019, 10/01/2020    Influenza, Seasonal, Quadrivalent, Adjuvanted 10/11/2021    Influenza, Unspecified 12/13/2005, 12/11/2006, 10/17/2008, 12/01/2012, 11/01/2013, 09/01/2014, 10/06/2015, 10/01/2021    Influenza, trivalent, adjuvanted 10/29/2024    Moderna COVID-19 vaccine, 12 years and older (50mcg/0.5mL)(Spikevax) 01/18/2024, 12/27/2024    Moderna COVID-19 vaccine, bivalent, blue cap/gray label *Check age/dose* 10/17/2022    Moderna SARS-CoV-2 Vaccination 02/16/2021, 03/19/2021, 11/08/2021    Pneumococcal conjugate vaccine, 13-valent (PREVNAR 13) 10/18/2017    Pneumococcal polysaccharide vaccine, 23-valent, age 2 years and older (PNEUMOVAX 23) 09/24/2018    Tdap vaccine, age 7 year and older (BOOSTRIX, ADACEL) 08/14/2023    Zoster vaccine, recombinant, adult (SHINGRIX) 07/19/2021, 01/18/2022        Review of Systems   Constitutional: Negative.    HENT: Negative.     Eyes: Negative.    Respiratory: Negative.     Cardiovascular: Negative.     Gastrointestinal: Negative.    Endocrine: Negative.    Genitourinary: Negative.    Musculoskeletal: Negative.    Skin: Negative.    Allergic/Immunologic: Negative.    Neurological: Negative.    Hematological: Negative.    Psychiatric/Behavioral: Negative.     All other systems reviewed and are negative.       Vitals:    05/14/25 1111   BP: (!) 159/100   Pulse: 91   Temp: 36.3 °C (97.3 °F)     Vitals:    05/14/25 1111   Weight: 108 kg (237 lb 9.6 oz)      Physical Exam  Constitutional:       General: She is not in acute distress.     Appearance: Normal appearance.   Neurological:      Mental Status: She is alert.          ASSESSMENT/PLAN: Hypertension with difficult control.  Continue current medications as noted.  Patient is referred to Dr. Pop for blood pressure evaluation and recommendations.  Continue to monitor salt in diet and continue weight loss.  Continue home blood pressure monitoring    Trigeminal neuralgia stable.  Patient is slowly weaning her gabapentin at this point.    Hyperlipidemia.  Continue pravastatin daily.    Follow-up pending consultation with Dr. Pop and call as needed              [1]   Past Medical History:  Diagnosis Date    Body mass index (BMI) 36.0-36.9, adult 09/28/2021    BMI 36.0-36.9,adult    Breast cancer     Dysphonia 11/16/2020    Dysphonia    Encounter for screening for malignant neoplasm of colon     Screen for colon cancer    Obesity, unspecified 05/19/2022    Class 2 obesity with body mass index (BMI) of 36.0 to 36.9 in adult    Personal history of irradiation     Personal history of malignant neoplasm of breast     History of malignant neoplasm of female breast    Personal history of other diseases of the circulatory system     History of hypertension    Personal history of other endocrine, nutritional and metabolic disease 09/28/2021    History of obesity    Personal history of other endocrine, nutritional and metabolic disease     History of high cholesterol     Personal history of other specified conditions 09/28/2021    History of hoarseness   [2]   Past Surgical History:  Procedure Laterality Date    BREAST BIOPSY      BREAST LUMPECTOMY      MOLE REMOVAL Left     chest  10/25/2023    OTHER SURGICAL HISTORY  08/01/2019    Hammer toe surgery    OTHER SURGICAL HISTORY  08/01/2019    Tubal ligation    OTHER SURGICAL HISTORY  05/19/2022    Mohs surgery    OTHER SURGICAL HISTORY  05/19/2022    Skin biopsy   [3]   Family History  Problem Relation Name Age of Onset    Other (cardiac disorder) Mother Mom     Hyperlipidemia Mother Mom     Hypertension Mother Mom     Other (malignant neoplasm of vulva) Mother Mom     Hypertension Brother Nate     Hypertension Brother Yordan    [4]   Current Outpatient Medications   Medication Sig Dispense Refill    acetaminophen (Tylenol 8 HOUR) 650 mg ER tablet Take 1 tablet (650 mg) by mouth if needed for mild pain (1 - 3), moderate pain (4 - 6) or headaches.      amLODIPine (Norvasc) 5 mg tablet Take 1 tablet (5 mg) by mouth once daily. 90 tablet 3    carBAMazepine (Carbatrol) 100 mg 12 hr capsule TAKE 1 CAPSULE BY MOUTH EVERY 12 HOURS 180 capsule 1    lisinopril 20 mg tablet TAKE 1 TABLET BY MOUTH TWICE  DAILY 180 tablet 1    loratadine (Claritin) 10 mg tablet Take 1 tablet (10 mg) by mouth once daily.      metoprolol succinate XL (Toprol-XL) 100 mg 24 hr tablet TAKE 1 TABLET BY MOUTH DAILY 90 tablet 1    metoprolol succinate XL (Toprol-XL) 25 mg 24 hr tablet Take 1 tablet (25 mg) by mouth once daily in the evening. Do not crush or chew. 90 tablet 3    pravastatin (Pravachol) 40 mg tablet TAKE 1 TABLET BY MOUTH AT  BEDTIME 90 tablet 3    gabapentin (Neurontin) 300 mg capsule Take 2 capsules (600 mg) by mouth 3 times a day. 2caps in am 1cap in afternoon and 2caps in pm 450 capsule 0     No current facility-administered medications for this visit.

## 2025-05-14 NOTE — PROGRESS NOTES
Subjective     Patient ID: Kelli Ellis is a 74 y.o. female who presents for Medicare Annual Wellness Visit Subsequent (awv):    Problem List Items Addressed This Visit    None     Medical History[1]   Surgical History[2]   Family History[3]   Social History     Socioeconomic History    Marital status:      Spouse name: Not on file    Number of children: Not on file    Years of education: Not on file    Highest education level: Not on file   Occupational History    Not on file   Tobacco Use    Smoking status: Former     Types: Cigarettes     Start date:      Quit date: 1967     Years since quittin.4    Smokeless tobacco: Never    Tobacco comments:     Have not smoked for over 25 years   Vaping Use    Vaping status: Never Used   Substance and Sexual Activity    Alcohol use: Yes     Alcohol/week: 2.0 standard drinks of alcohol     Types: 2 Standard drinks or equivalent per week     Comment: Prefer wine, some weeks, none, some weeks a few    Drug use: Never    Sexual activity: Not Currently     Partners: Male     Birth control/protection: None     Comment: Age - 74   Other Topics Concern    Not on file   Social History Narrative    Not on file     Social Drivers of Health     Financial Resource Strain: Not on file   Food Insecurity: Not on file   Transportation Needs: Not on file   Physical Activity: Not on file   Stress: Not on file   Social Connections: Not on file   Intimate Partner Violence: Not on file   Housing Stability: Not on file      Chlorthalidone, Methylprednisolone, and Triamterene-hydrochlorothiazid   Current Medications[4]    Immunization History   Administered Date(s) Administered    Flu vaccine (IIV4), preservative free *Check age/dose* 11/15/2016, 2022    Flu vaccine, quadrivalent, high-dose, preservative free, age 65y+ (FLUZONE) 2023    Flu vaccine, trivalent, preservative free, HIGH-DOSE, age 65y+ (Fluzone) 10/18/2017, 2018, 10/01/2019, 10/01/2020    Influenza,  Seasonal, Quadrivalent, Adjuvanted 10/11/2021    Influenza, Unspecified 12/13/2005, 12/11/2006, 10/17/2008, 12/01/2012, 11/01/2013, 09/01/2014, 10/06/2015, 10/01/2021    Influenza, trivalent, adjuvanted 10/29/2024    Moderna COVID-19 vaccine, 12 years and older (50mcg/0.5mL)(Spikevax) 01/18/2024, 12/27/2024    Moderna COVID-19 vaccine, bivalent, blue cap/gray label *Check age/dose* 10/17/2022    Moderna SARS-CoV-2 Vaccination 02/16/2021, 03/19/2021, 11/08/2021    Pneumococcal conjugate vaccine, 13-valent (PREVNAR 13) 10/18/2017    Pneumococcal polysaccharide vaccine, 23-valent, age 2 years and older (PNEUMOVAX 23) 09/24/2018    Tdap vaccine, age 7 year and older (BOOSTRIX, ADACEL) 08/14/2023    Zoster vaccine, recombinant, adult (SHINGRIX) 07/19/2021, 01/18/2022        Review of Systems     Vitals:    05/14/25 1111   BP: (!) 159/100   Pulse: 91   Temp: 36.3 °C (97.3 °F)     Vitals:    05/14/25 1111   Weight: 108 kg (237 lb 9.6 oz)      Physical Exam     ASSESSMENT/PLAN:       Scribe Attestation  By signing my name below, I, Kinsey Yanez LPN, Scribe   attest that this documentation has been prepared under the direction and in the presence of Isaac Mendoza MD.         [1]   Past Medical History:  Diagnosis Date    Body mass index (BMI) 36.0-36.9, adult 09/28/2021    BMI 36.0-36.9,adult    Breast cancer     Dysphonia 11/16/2020    Dysphonia    Encounter for screening for malignant neoplasm of colon     Screen for colon cancer    Obesity, unspecified 05/19/2022    Class 2 obesity with body mass index (BMI) of 36.0 to 36.9 in adult    Personal history of irradiation     Personal history of malignant neoplasm of breast     History of malignant neoplasm of female breast    Personal history of other diseases of the circulatory system     History of hypertension    Personal history of other endocrine, nutritional and metabolic disease 09/28/2021    History of obesity    Personal history of other endocrine,  nutritional and metabolic disease     History of high cholesterol    Personal history of other specified conditions 09/28/2021    History of hoarseness   [2]   Past Surgical History:  Procedure Laterality Date    BREAST BIOPSY      BREAST LUMPECTOMY      MOLE REMOVAL Left     chest  10/25/2023    OTHER SURGICAL HISTORY  08/01/2019    Hammer toe surgery    OTHER SURGICAL HISTORY  08/01/2019    Tubal ligation    OTHER SURGICAL HISTORY  05/19/2022    Mohs surgery    OTHER SURGICAL HISTORY  05/19/2022    Skin biopsy   [3]   Family History  Problem Relation Name Age of Onset    Other (cardiac disorder) Mother Mom     Hyperlipidemia Mother Mom     Hypertension Mother Mom     Other (malignant neoplasm of vulva) Mother Mom     Hypertension Brother Nate     Hypertension Brother Yordan    [4]   Current Outpatient Medications   Medication Sig Dispense Refill    acetaminophen (Tylenol 8 HOUR) 650 mg ER tablet Take 1 tablet (650 mg) by mouth 2 times a day. (Patient taking differently: Take 1 tablet (650 mg) by mouth if needed.)      amLODIPine (Norvasc) 5 mg tablet Take 1 tablet (5 mg) by mouth once daily. 90 tablet 3    carBAMazepine (Carbatrol) 100 mg 12 hr capsule TAKE 1 CAPSULE BY MOUTH EVERY 12 HOURS 180 capsule 1    gabapentin (Neurontin) 300 mg capsule TAKE 2 CAPSULES BY MOUTH 3 TIMES DAILY (Patient taking differently: Take 2 capsules (600 mg) by mouth 3 times a day. 2caps in am 1cap in afternoon and 2caps in pm) 360 capsule 0    lisinopril 20 mg tablet TAKE 1 TABLET BY MOUTH TWICE  DAILY 180 tablet 1    loratadine (Claritin) 10 mg tablet Take 1 tablet (10 mg) by mouth once daily.      metoprolol succinate XL (Toprol-XL) 100 mg 24 hr tablet TAKE 1 TABLET BY MOUTH DAILY 90 tablet 1    metoprolol succinate XL (Toprol-XL) 25 mg 24 hr tablet Take 1 tablet (25 mg) by mouth once daily in the evening. Do not crush or chew. 90 tablet 3    pravastatin (Pravachol) 40 mg tablet TAKE 1 TABLET BY MOUTH AT  BEDTIME 90 tablet 3     hydroCHLOROthiazide (Microzide) 12.5 mg tablet Take 1 tablet (12.5 mg) by mouth once daily. (Patient not taking: Reported on 5/14/2025) 90 tablet 1     No current facility-administered medications for this visit.

## 2025-05-21 ENCOUNTER — APPOINTMENT (OUTPATIENT)
Dept: NEPHROLOGY | Facility: CLINIC | Age: 75
End: 2025-05-21
Payer: MEDICARE

## 2025-05-23 NOTE — TELEPHONE ENCOUNTER
Patient left voicemail that she is feeling worse than when she was seen on 5/14/25  Has bad cough, phlegm and congestion for 1 week   Would like an antibiotic sent to [facility]

## 2025-05-26 ENCOUNTER — ANCILLARY PROCEDURE (OUTPATIENT)
Dept: URGENT CARE | Age: 75
End: 2025-05-26
Payer: MEDICARE

## 2025-05-26 ENCOUNTER — OFFICE VISIT (OUTPATIENT)
Dept: URGENT CARE | Age: 75
End: 2025-05-26
Payer: MEDICARE

## 2025-05-26 VITALS
WEIGHT: 237 LBS | RESPIRATION RATE: 15 BRPM | OXYGEN SATURATION: 99 % | BODY MASS INDEX: 38.25 KG/M2 | TEMPERATURE: 98.6 F | HEART RATE: 88 BPM | DIASTOLIC BLOOD PRESSURE: 96 MMHG | SYSTOLIC BLOOD PRESSURE: 154 MMHG

## 2025-05-26 DIAGNOSIS — R05.2 SUBACUTE COUGH: ICD-10-CM

## 2025-05-26 DIAGNOSIS — J18.9 PNEUMONIA OF LEFT LUNG DUE TO INFECTIOUS ORGANISM, UNSPECIFIED PART OF LUNG: ICD-10-CM

## 2025-05-26 DIAGNOSIS — J69.0 ASPIRATION PNEUMONIA OF LEFT LUNG, UNSPECIFIED ASPIRATION PNEUMONIA TYPE, UNSPECIFIED PART OF LUNG (MULTI): Primary | ICD-10-CM

## 2025-05-26 PROCEDURE — 71046 X-RAY EXAM CHEST 2 VIEWS: CPT

## 2025-05-26 RX ORDER — DOXYCYCLINE HYCLATE 100 MG
100 TABLET ORAL 2 TIMES DAILY
Qty: 10 TABLET | Refills: 0 | Status: SHIPPED | OUTPATIENT
Start: 2025-05-26 | End: 2025-05-31

## 2025-05-26 RX ORDER — CEFUROXIME AXETIL 500 MG/1
500 TABLET ORAL 2 TIMES DAILY
Qty: 10 TABLET | Refills: 0 | Status: SHIPPED | OUTPATIENT
Start: 2025-05-26 | End: 2025-05-31

## 2025-05-26 ASSESSMENT — ENCOUNTER SYMPTOMS: COUGH: 1

## 2025-05-26 NOTE — PROGRESS NOTES
Subjective   Patient ID: Kelli Ellis is a 74 y.o. female. They present today with a chief complaint of Cough (Cough 12 days. ).    History of Present Illness  Patient presents with two weeks of an occasionally productive cough. The sputum is described as yellow. Her cough is not associated with shortness of breath, wheezing, or fever. However, she does have difficulty lying flat because her cough worsens. She has a mild runny nose and nasal congestion. She also endorses a sore throat from coughing. She has no ear pain, sinus pain, or pressure. She's been taking over the counter cough and cold remedies without improvement in her symptoms.     Review of Systems   Constitutional:  Denies fever, chills, malaise, fatigue   ENT: See HPI  Respiratory:  See HPI    Cardiovascular:  Denies chest pain, palpitations, syncope, lightheadedness, dizziness.    Gastrointestinal:  Denies abdominal pain, nausea, vomiting, diarrhea.    Integumentary:  Denies rash.    All other systems are negative        History provided by:  Patient   used: No    Cough        Past Medical History  Allergies as of 05/26/2025 - Reviewed 05/26/2025   Allergen Reaction Noted    Chlorthalidone Other and Unknown 09/09/2023    Methylprednisolone Unknown 09/09/2023    Triamterene-hydrochlorothiazid Unknown 09/09/2023       Prescriptions Prior to Admission[1]     Medical History[2]    Surgical History[3]     reports that she quit smoking about 58 years ago. Her smoking use included cigarettes. She started smoking about 33 years ago. She has never used smokeless tobacco. She reports current alcohol use of about 2.0 standard drinks of alcohol per week. She reports that she does not use drugs.    Review of Systems  Review of Systems   Respiratory:  Positive for cough.                                   Objective    Vitals:    05/26/25 0810   BP: (!) 168/94   Pulse: 88   Resp: 15   Temp: 37 °C (98.6 °F)   SpO2: 99%   Weight: 108 kg (237 lb)      No LMP recorded. Patient is postmenopausal.    Physical Exam  Vitals and nursing note reviewed.   Constitutional:       General: She is not in acute distress.     Appearance: Normal appearance. She is normal weight. She is ill-appearing. She is not toxic-appearing or diaphoretic.   HENT:      Nose: Congestion and rhinorrhea present.      Mouth/Throat:      Mouth: Mucous membranes are moist.      Pharynx: Oropharynx is clear. No oropharyngeal exudate or posterior oropharyngeal erythema.   Eyes:      General: No scleral icterus.        Right eye: No discharge.         Left eye: No discharge.      Conjunctiva/sclera: Conjunctivae normal.      Pupils: Pupils are equal, round, and reactive to light.   Cardiovascular:      Rate and Rhythm: Normal rate and regular rhythm.      Pulses: Normal pulses.      Heart sounds: Normal heart sounds.   Pulmonary:      Effort: Pulmonary effort is normal. No respiratory distress.      Breath sounds: No stridor. Examination of the left-lower field reveals rales. Rales present. No wheezing or rhonchi.   Chest:      Chest wall: No tenderness.   Musculoskeletal:      Cervical back: No rigidity or tenderness.   Lymphadenopathy:      Cervical: No cervical adenopathy.   Skin:     General: Skin is warm and dry.      Coloration: Skin is not jaundiced or pale.      Findings: No bruising, erythema or rash.   Neurological:      General: No focal deficit present.      Mental Status: She is alert.         Procedures    Point of Care Test & Imaging Results from this visit  No results found for this visit on 05/26/25.   Imaging  No results found.    Cardiology, Vascular, and Other Imaging  No other imaging results found for the past 2 days      Diagnostic study results (if any) were reviewed by YARY Alvarado.    Assessment/Plan   Allergies, medications, history, and pertinent labs/EKGs/Imaging reviewed by YARY Alvarado.     Medical Decision Making    Patient presents with 12  days of productive cough. She denied shortness of breath or wheezing; however she's had difficulty lying flat due to coughing. On exam she had crackles in the left lower lobe. Her vital signs are stable. She is not hypoxic, febrile or tachypneic. There is no evidence of sinusitis,  strep or otitis media. Her chest X-ray had the following findings:     IMPRESSION:     Hyperinflated lungs with pleural thickening blunting the left lateral and posterior costophrenic angles. Status post lumpectomy on the left.     Elected to treat for pneumonia based on physical exam findings. She was prescribed cefuroxime and doxycycline. We discussed symptomatic and supportive care. I advise you follow up with her primary care doctor this week and we discussed when to seek care in the emergency room. She verbalized understanding and is comfortable with this plan. all questions were answered prior to her discharge.     Orders and Diagnoses  There are no diagnoses linked to this encounter.    Medical Admin Record      Patient disposition: Home    Electronically signed by YARY Alvarado  8:23 AM           [1] (Not in a hospital admission)  [2]   Past Medical History:  Diagnosis Date    Body mass index (BMI) 36.0-36.9, adult 09/28/2021    BMI 36.0-36.9,adult    Breast cancer     Dysphonia 11/16/2020    Dysphonia    Encounter for screening for malignant neoplasm of colon     Screen for colon cancer    Obesity, unspecified 05/19/2022    Class 2 obesity with body mass index (BMI) of 36.0 to 36.9 in adult    Personal history of irradiation     Personal history of malignant neoplasm of breast     History of malignant neoplasm of female breast    Personal history of other diseases of the circulatory system     History of hypertension    Personal history of other endocrine, nutritional and metabolic disease 09/28/2021    History of obesity    Personal history of other endocrine, nutritional and metabolic disease     History of high  cholesterol    Personal history of other specified conditions 09/28/2021    History of hoarseness   [3]   Past Surgical History:  Procedure Laterality Date    BREAST BIOPSY      BREAST LUMPECTOMY      MOLE REMOVAL Left     chest  10/25/2023    OTHER SURGICAL HISTORY  08/01/2019    Hammer toe surgery    OTHER SURGICAL HISTORY  08/01/2019    Tubal ligation    OTHER SURGICAL HISTORY  05/19/2022    Mohs surgery    OTHER SURGICAL HISTORY  05/19/2022    Skin biopsy

## 2025-05-27 ENCOUNTER — TELEPHONE (OUTPATIENT)
Dept: PRIMARY CARE | Facility: CLINIC | Age: 75
End: 2025-05-27
Payer: MEDICARE

## 2025-05-27 NOTE — TELEPHONE ENCOUNTER
Patient went to urgent care on  5/28 and was put on doxycilne hyclate 100mg two a day & cefuroxineaxetil 500mg two a day. Both of these medications were prescribed for 5 days. She is worried about pneumonia as that is what they put down on her exam and is wondering if you would like her added to your schedule sometime in the next two weeks.

## 2025-05-30 DIAGNOSIS — J69.0 ASPIRATION PNEUMONIA OF LEFT LUNG, UNSPECIFIED ASPIRATION PNEUMONIA TYPE, UNSPECIFIED PART OF LUNG (MULTI): ICD-10-CM

## 2025-06-02 RX ORDER — DOXYCYCLINE HYCLATE 100 MG
100 TABLET ORAL 2 TIMES DAILY
Qty: 10 TABLET | Refills: 0 | Status: SHIPPED | OUTPATIENT
Start: 2025-06-02 | End: 2025-06-07

## 2025-06-05 ENCOUNTER — APPOINTMENT (OUTPATIENT)
Dept: PRIMARY CARE | Facility: CLINIC | Age: 75
End: 2025-06-05
Payer: MEDICARE

## 2025-06-12 ENCOUNTER — APPOINTMENT (OUTPATIENT)
Dept: PRIMARY CARE | Facility: CLINIC | Age: 75
End: 2025-06-12
Payer: MEDICARE

## 2025-06-12 ENCOUNTER — PATIENT MESSAGE (OUTPATIENT)
Dept: PRIMARY CARE | Facility: CLINIC | Age: 75
End: 2025-06-12

## 2025-06-12 VITALS
HEIGHT: 66 IN | DIASTOLIC BLOOD PRESSURE: 84 MMHG | SYSTOLIC BLOOD PRESSURE: 160 MMHG | TEMPERATURE: 97.5 F | BODY MASS INDEX: 37.67 KG/M2 | WEIGHT: 234.4 LBS | HEART RATE: 69 BPM

## 2025-06-12 DIAGNOSIS — E66.01 OBESITY, MORBID (MULTI): Primary | ICD-10-CM

## 2025-06-12 DIAGNOSIS — R60.0 EDEMA OF LEFT LOWER EXTREMITY: ICD-10-CM

## 2025-06-12 DIAGNOSIS — I10 ESSENTIAL HYPERTENSION: ICD-10-CM

## 2025-06-12 DIAGNOSIS — I87.2 CHRONIC VENOUS INSUFFICIENCY OF LOWER EXTREMITY: ICD-10-CM

## 2025-06-12 DIAGNOSIS — E66.812 CLASS 2 OBESITY WITH BODY MASS INDEX (BMI) OF 37.0 TO 37.9 IN ADULT, UNSPECIFIED OBESITY TYPE, UNSPECIFIED WHETHER SERIOUS COMORBIDITY PRESENT: ICD-10-CM

## 2025-06-12 DIAGNOSIS — Z87.891 FORMER SMOKER: ICD-10-CM

## 2025-06-12 DIAGNOSIS — J18.9 PNEUMONIA DUE TO INFECTIOUS ORGANISM, UNSPECIFIED LATERALITY, UNSPECIFIED PART OF LUNG: ICD-10-CM

## 2025-06-12 PROCEDURE — 1160F RVW MEDS BY RX/DR IN RCRD: CPT | Performed by: FAMILY MEDICINE

## 2025-06-12 PROCEDURE — 3079F DIAST BP 80-89 MM HG: CPT | Performed by: FAMILY MEDICINE

## 2025-06-12 PROCEDURE — 1036F TOBACCO NON-USER: CPT | Performed by: FAMILY MEDICINE

## 2025-06-12 PROCEDURE — 99213 OFFICE O/P EST LOW 20 MIN: CPT | Performed by: FAMILY MEDICINE

## 2025-06-12 PROCEDURE — 3077F SYST BP >= 140 MM HG: CPT | Performed by: FAMILY MEDICINE

## 2025-06-12 PROCEDURE — 1159F MED LIST DOCD IN RCRD: CPT | Performed by: FAMILY MEDICINE

## 2025-06-12 PROCEDURE — 3008F BODY MASS INDEX DOCD: CPT | Performed by: FAMILY MEDICINE

## 2025-06-12 NOTE — PROGRESS NOTES
Zabrina Pereira is here for a follow-up on pneumonia.  The patient began to have nasal congestion with coughing and fever about 2 to 3 weeks ago.  She went to a [facility] center on May 26, 2025.  She was treated for pneumonia with cefuroxime and doxycycline.  Her cough gradually improved and at this point she is feeling much better.  She has had no further fevers.  Her chest x-ray showed some pleural blunting of the left lateral and posterior costophrenic angles as well as hyperinflation.  She also would like to discuss a GLP medication for weight loss.  Family members have tried this with good results.  Her home blood pressures have been variable with readings occasionally in the 150s and diastolics in the 90s.  She also has chronic swelling of the left lower leg.  Her venous duplex scan was negative.  She has been getting some weeping of the legs as well.    Patient ID: Kelli Ellis is a 74 y.o. female who presents for Follow-up (Follow up on urgent care visit for pneumonia. ):    Problem List Items Addressed This Visit    None     Medical History[1]   Surgical History[2]   Family History[3]   Social History     Socioeconomic History    Marital status:      Spouse name: Not on file    Number of children: Not on file    Years of education: Not on file    Highest education level: Not on file   Occupational History    Not on file   Tobacco Use    Smoking status: Former     Types: Cigarettes     Start date:      Quit date: 1967     Years since quittin.4    Smokeless tobacco: Never    Tobacco comments:     Have not smoked for over 25 years   Vaping Use    Vaping status: Never Used   Substance and Sexual Activity    Alcohol use: Yes     Alcohol/week: 2.0 standard drinks of alcohol     Types: 2 Standard drinks or equivalent per week     Comment: Prefer wine, some weeks, none, some weeks a few    Drug use: Never    Sexual activity: Not Currently     Partners: Male     Birth control/protection: None     Comment:  Age - 74   Other Topics Concern    Not on file   Social History Narrative    Not on file     Social Drivers of Health     Financial Resource Strain: Not on file   Food Insecurity: Not on file   Transportation Needs: Not on file   Physical Activity: Not on file   Stress: Not on file   Social Connections: Not on file   Intimate Partner Violence: Not on file   Housing Stability: Not on file      Chlorthalidone, Methylprednisolone, and Triamterene-hydrochlorothiazid   Current Medications[4]    Immunization History   Administered Date(s) Administered    Flu vaccine (IIV4), preservative free *Check age/dose* 11/15/2016, 11/01/2022    Flu vaccine, quadrivalent, high-dose, preservative free, age 65y+ (FLUZONE) 11/13/2023    Flu vaccine, trivalent, preservative free, HIGH-DOSE, age 65y+ (Fluzone) 10/18/2017, 09/24/2018, 10/01/2019, 10/01/2020    Influenza, Seasonal, Quadrivalent, Adjuvanted 10/11/2021    Influenza, Unspecified 12/13/2005, 12/11/2006, 10/17/2008, 12/01/2012, 11/01/2013, 09/01/2014, 10/06/2015, 10/01/2021    Influenza, trivalent, adjuvanted 10/29/2024    Moderna COVID-19 vaccine, 12 years and older (50mcg/0.5mL)(Spikevax) 01/18/2024, 12/27/2024    Moderna COVID-19 vaccine, bivalent, blue cap/gray label *Check age/dose* 10/17/2022    Moderna SARS-CoV-2 Vaccination 02/16/2021, 03/19/2021, 11/08/2021    Pneumococcal conjugate vaccine, 13-valent (PREVNAR 13) 10/18/2017    Pneumococcal polysaccharide vaccine, 23-valent, age 2 years and older (PNEUMOVAX 23) 09/24/2018    Tdap vaccine, age 7 year and older (BOOSTRIX, ADACEL) 08/14/2023    Zoster vaccine, recombinant, adult (SHINGRIX) 07/19/2021, 01/18/2022        Review of Systems   Cardiovascular:  Positive for leg swelling.   All other systems reviewed and are negative.       Vitals:    06/12/25 1302   BP: 160/84   Pulse: 69   Temp: 36.4 °C (97.5 °F)     Vitals:    06/12/25 1302   Weight: 106 kg (234 lb 6.4 oz)      Physical Exam  Constitutional:       General: She  is not in acute distress.     Appearance: Normal appearance.   Cardiovascular:      Rate and Rhythm: Normal rate and regular rhythm.      Pulses: Normal pulses.      Heart sounds: Normal heart sounds. No murmur heard.     No friction rub. No gallop.   Pulmonary:      Effort: Pulmonary effort is normal. No respiratory distress.      Breath sounds: Normal breath sounds. No wheezing or rales.   Neurological:      Mental Status: She is alert.     Left lower leg-diffuse swelling.  The leg is wrapped in a Ace wrap which was not removed.  There is swelling at the level ankle area  Right lower leg-normal without swelling    ASSESSMENT/PLAN: Resolving pneumonia/bronchitis.  Call for any recurrent respiratory symptoms.  Recommended obtaining RSV vaccination in 2 to 3 months.    Hypertension with variable control.  Continue current medications as noted.  Nephrology consultation is pending    Chronic left leg swelling with venous insufficiency.  Patient referred to Dr. Gonzalez for venous evaluation.  Continue to use compression stockings and elevate legs when possible    Obesity.  The patient and I discussed GLP agonists in detail.  She understands potential side effects and risks.  She will check with her insurance company regarding coverage for this and let us know when to proceed.    Hyperlipidemia.  Continue pravastatin daily.    Follow-up in 3 months pending above and call as needed       Scribe Attestation  By signing my name below, I, Kinsey Yanez LPN, Scribe   attest that this documentation has been prepared under the direction and in the presence of Isaac Mendoza MD.         [1]   Past Medical History:  Diagnosis Date    Body mass index (BMI) 36.0-36.9, adult 09/28/2021    BMI 36.0-36.9,adult    Breast cancer     Dysphonia 11/16/2020    Dysphonia    Encounter for screening for malignant neoplasm of colon     Screen for colon cancer    Obesity, unspecified 05/19/2022    Class 2 obesity with body mass index (BMI)  of 36.0 to 36.9 in adult    Personal history of irradiation     Personal history of malignant neoplasm of breast     History of malignant neoplasm of female breast    Personal history of other diseases of the circulatory system     History of hypertension    Personal history of other endocrine, nutritional and metabolic disease 09/28/2021    History of obesity    Personal history of other endocrine, nutritional and metabolic disease     History of high cholesterol    Personal history of other specified conditions 09/28/2021    History of hoarseness   [2]   Past Surgical History:  Procedure Laterality Date    BREAST BIOPSY      BREAST LUMPECTOMY      MOLE REMOVAL Left     chest  10/25/2023    OTHER SURGICAL HISTORY  08/01/2019    Hammer toe surgery    OTHER SURGICAL HISTORY  08/01/2019    Tubal ligation    OTHER SURGICAL HISTORY  05/19/2022    Mohs surgery    OTHER SURGICAL HISTORY  05/19/2022    Skin biopsy   [3]   Family History  Problem Relation Name Age of Onset    Other (cardiac disorder) Mother Mom     Hyperlipidemia Mother Mom     Hypertension Mother Mom     Other (malignant neoplasm of vulva) Mother Mom     Hypertension Brother Nate     Hypertension Brother Yordan    [4]   Current Outpatient Medications   Medication Sig Dispense Refill    acetaminophen (Tylenol 8 HOUR) 650 mg ER tablet Take 1 tablet (650 mg) by mouth if needed for mild pain (1 - 3), moderate pain (4 - 6) or headaches.      amLODIPine (Norvasc) 5 mg tablet Take 1 tablet (5 mg) by mouth once daily. 90 tablet 3    benzonatate (Tessalon) 100 mg capsule Take 1 capsule (100 mg) by mouth 3 times a day as needed for cough. Do not crush or chew. 30 capsule 0    carBAMazepine (Carbatrol) 100 mg 12 hr capsule TAKE 1 CAPSULE BY MOUTH EVERY 12 HOURS 180 capsule 1    gabapentin (Neurontin) 300 mg capsule Take 2 capsules (600 mg) by mouth 3 times a day. 2caps in am 1cap in afternoon and 2caps in pm 450 capsule 0    lisinopril 20 mg tablet TAKE 1 TABLET BY  MOUTH TWICE  DAILY 180 tablet 1    loratadine (Claritin) 10 mg tablet Take 1 tablet (10 mg) by mouth once daily.      metoprolol succinate XL (Toprol-XL) 100 mg 24 hr tablet TAKE 1 TABLET BY MOUTH DAILY 90 tablet 1    metoprolol succinate XL (Toprol-XL) 25 mg 24 hr tablet Take 1 tablet (25 mg) by mouth once daily in the evening. Do not crush or chew. 90 tablet 3    pravastatin (Pravachol) 40 mg tablet TAKE 1 TABLET BY MOUTH AT  BEDTIME 90 tablet 3     No current facility-administered medications for this visit.

## 2025-06-27 RX ORDER — AMLODIPINE BESYLATE 5 MG/1
5 TABLET ORAL DAILY
Qty: 90 TABLET | Refills: 1 | Status: SHIPPED | OUTPATIENT
Start: 2025-06-27

## 2025-06-30 DIAGNOSIS — E66.01 OBESITY, MORBID (MULTI): Primary | ICD-10-CM

## 2025-06-30 RX ORDER — TIRZEPATIDE 2.5 MG/.5ML
2.5 INJECTION, SOLUTION SUBCUTANEOUS WEEKLY
Qty: 9 ML | Refills: 0 | Status: SHIPPED | OUTPATIENT
Start: 2025-06-30

## 2025-07-08 DIAGNOSIS — G50.0 TRIGEMINAL NEURALGIA: ICD-10-CM

## 2025-07-08 DIAGNOSIS — I10 ESSENTIAL HYPERTENSION: ICD-10-CM

## 2025-07-08 RX ORDER — LISINOPRIL 20 MG/1
20 TABLET ORAL 2 TIMES DAILY
Qty: 180 TABLET | Refills: 1 | Status: SHIPPED | OUTPATIENT
Start: 2025-07-08

## 2025-07-08 RX ORDER — CARBAMAZEPINE 100 MG/1
100 CAPSULE, EXTENDED RELEASE ORAL EVERY 12 HOURS
Qty: 180 CAPSULE | Refills: 1 | Status: SHIPPED | OUTPATIENT
Start: 2025-07-08

## 2025-07-08 RX ORDER — GABAPENTIN 300 MG/1
600 CAPSULE ORAL 3 TIMES DAILY
Qty: 450 CAPSULE | Refills: 0 | Status: SHIPPED | OUTPATIENT
Start: 2025-07-08

## 2025-07-08 RX ORDER — METOPROLOL SUCCINATE 100 MG/1
100 TABLET, EXTENDED RELEASE ORAL DAILY
Qty: 90 TABLET | Refills: 1 | Status: SHIPPED | OUTPATIENT
Start: 2025-07-08

## 2025-07-18 ENCOUNTER — APPOINTMENT (OUTPATIENT)
Dept: NEPHROLOGY | Facility: CLINIC | Age: 75
End: 2025-07-18
Payer: MEDICARE

## 2025-07-18 VITALS
DIASTOLIC BLOOD PRESSURE: 86 MMHG | SYSTOLIC BLOOD PRESSURE: 148 MMHG | WEIGHT: 233 LBS | BODY MASS INDEX: 37.45 KG/M2 | HEART RATE: 70 BPM | HEIGHT: 66 IN

## 2025-07-18 DIAGNOSIS — E87.1 HYPONATREMIA: ICD-10-CM

## 2025-07-18 DIAGNOSIS — I10 ESSENTIAL HYPERTENSION: Primary | ICD-10-CM

## 2025-07-18 PROCEDURE — 3079F DIAST BP 80-89 MM HG: CPT | Performed by: INTERNAL MEDICINE

## 2025-07-18 PROCEDURE — 3008F BODY MASS INDEX DOCD: CPT | Performed by: INTERNAL MEDICINE

## 2025-07-18 PROCEDURE — 3077F SYST BP >= 140 MM HG: CPT | Performed by: INTERNAL MEDICINE

## 2025-07-18 PROCEDURE — 99204 OFFICE O/P NEW MOD 45 MIN: CPT | Performed by: INTERNAL MEDICINE

## 2025-07-18 PROCEDURE — 1159F MED LIST DOCD IN RCRD: CPT | Performed by: INTERNAL MEDICINE

## 2025-07-18 RX ORDER — CLONIDINE 0.1 MG/24H
PATCH, EXTENDED RELEASE TRANSDERMAL
Qty: 13 PATCH | Refills: 3 | Status: SHIPPED | OUTPATIENT
Start: 2025-07-18 | End: 2025-07-18 | Stop reason: SDUPTHER

## 2025-07-18 RX ORDER — CLONIDINE 0.1 MG/24H
PATCH, EXTENDED RELEASE TRANSDERMAL
Qty: 13 PATCH | Refills: 3 | Status: SHIPPED | OUTPATIENT
Start: 2025-07-18 | End: 2026-07-18

## 2025-07-18 NOTE — PROGRESS NOTES
Kelli Ellis   74 y.o.      Vitals:    07/18/25 1326   Weight: 106 kg (233 lb)      MRN/Room: [id number]/Room/bed info not found      History Of Present Illness  Kelli Ellis is a 74 y.o. female presenting with low Na level.     Past Medical History  She has a past medical history of Body mass index (BMI) 36.0-36.9, adult (09/28/2021), Breast cancer, Dysphonia (11/16/2020), Encounter for screening for malignant neoplasm of colon, Hyperlipidemia (Guessing Jan., 2015), Hypertension (Jan.,2015), Obesity, unspecified (05/19/2022), Personal history of irradiation, Personal history of malignant neoplasm of breast, Personal history of other diseases of the circulatory system, Personal history of other endocrine, nutritional and metabolic disease (09/28/2021), Personal history of other endocrine, nutritional and metabolic disease, and Personal history of other specified conditions (09/28/2021).    Surgical History  She has a past surgical history that includes Other surgical history (08/01/2019); Other surgical history (08/01/2019); Other surgical history (05/19/2022); Other surgical history (05/19/2022); Breast biopsy; Breast lumpectomy; and Mole removal (Left).     Social History  She reports that she quit smoking about 30 years ago. Her smoking use included cigarettes. She started smoking about 33 years ago. She has a 27 pack-year smoking history. She has never used smokeless tobacco. She reports current alcohol use of about 2.0 standard drinks of alcohol per week. She reports that she does not use drugs.    Family History  Family History[1]     Allergies  Chlorthalidone, Methylprednisolone, and Triamterene-hydrochlorothiazid      Meds:         Current Medications[2]      ROS:  The patient is awake and oriented. No dizziness or lightheadedness. No chills and no fever. No headaches. No nausea and no vomiting. No shortness of breath. No cough. No chest pain. No abdominal pain. No diarrhea. No hematemesis or hemoptysis. No  hematuria. No rectal bleeding. No melena. No epistaxis. No urinary symptoms. No flank pain. No leg edema. No leg pain. No itching. Overall, the rest of the review of systems is also negative.  12 point review of systems otherwise negative as stated in HPI.        Physical Exam:        Vitals:    07/18/25 1326   BP: 148/86   Pulse: 70     General: The patient is awake, oriented, and is not in any distress.  Head and Neck: Normocephalic. No periorbital edema.  Eyes: Not icteric.   Respiratory: Symmetric chest expansion. No respiratory distress.  Skin: No maculopapular rash.  Musculoskeletal: No peripheral edema.  Neuro Exam: Speech is fluent. Moves extremities.        Blood Labs:  No results found for this or any previous visit (from the past 24 hours).   Lab Results   Component Value Date    GLUCOSE 100 (H) 03/31/2025    CALCIUM 9.5 03/31/2025     (L) 03/31/2025    K 4.1 03/31/2025    CO2 26 03/31/2025    CL 92 (L) 03/31/2025    BUN 9 03/31/2025    CREATININE 0.55 (L) 03/31/2025       Imaging:        Assessment and Plan:  #1 hypertension.  Blood pressure is high.  She is on multiple antihypertensive medications.  Previously she had leg edema on 10 mg of amlodipine.  Because of hyponatremia I cannot put her on thiazide diuretic.  So, I added clonidine patch to her medications.  I asked for a kidney ultrasound, microscopic urinalysis, and spot urine protein to creatinine ratio.    2.  Hyponatremia.  Probably because of carbamazepine.  I instructed her to be on fluid restriction.    I will see her in about 3 months for follow-up.        Judson Pop MD  Senior Attending Physician  Director of Onco-Nephrology Program  Division of Nephrology & Hypertension  [facility]       [1]   Family History  Problem Relation Name Age of Onset    Other (cardiac disorder) Mother Mom     Hyperlipidemia Mother Mom     Hypertension Mother Mom     Other (malignant neoplasm of vulva) Mother Mom      Hypertension Brother Nate     Hypertension Brother Yordan    [2]   Current Outpatient Medications   Medication Sig Dispense Refill    acetaminophen (Tylenol 8 HOUR) 650 mg ER tablet Take 1 tablet (650 mg) by mouth if needed for mild pain (1 - 3), moderate pain (4 - 6) or headaches.      amLODIPine (Norvasc) 5 mg tablet Take 1 tablet (5 mg) by mouth once daily. 90 tablet 1    carBAMazepine (Carbatrol) 100 mg 12 hr capsule TAKE 1 CAPSULE BY MOUTH EVERY 12 HOURS 180 capsule 1    gabapentin (Neurontin) 300 mg capsule TAKE 2 CAPSULES BY MOUTH 3 TIMES DAILY 450 capsule 0    lisinopril 20 mg tablet TAKE 1 TABLET BY MOUTH TWICE  DAILY 180 tablet 1    loratadine (Claritin) 10 mg tablet Take 1 tablet (10 mg) by mouth once daily.      metoprolol succinate XL (Toprol-XL) 100 mg 24 hr tablet TAKE 1 TABLET BY MOUTH DAILY 90 tablet 1    metoprolol succinate XL (Toprol-XL) 25 mg 24 hr tablet Take 1 tablet (25 mg) by mouth once daily in the evening. Do not crush or chew. 90 tablet 3    pravastatin (Pravachol) 40 mg tablet TAKE 1 TABLET BY MOUTH AT  BEDTIME 90 tablet 3    tirzepatide (Mounjaro) 2.5 mg/0.5 mL pen injector Inject 2.5 mg under the skin 1 (one) time per week. (Patient not taking: Reported on 7/18/2025) 9 mL 0     No current facility-administered medications for this visit.

## 2025-07-21 ENCOUNTER — TELEPHONE (OUTPATIENT)
Dept: PRIMARY CARE | Facility: CLINIC | Age: 75
End: 2025-07-21
Payer: MEDICARE

## 2025-07-21 DIAGNOSIS — I10 ESSENTIAL HYPERTENSION: Primary | ICD-10-CM

## 2025-07-21 RX ORDER — AMLODIPINE BESYLATE 10 MG/1
10 TABLET ORAL DAILY
Qty: 90 TABLET | Refills: 3 | Status: SHIPPED | OUTPATIENT
Start: 2025-07-21 | End: 2026-07-21

## 2025-07-21 NOTE — TELEPHONE ENCOUNTER
Spoke with PT who wanted to review and update regarding recents changes from Dr. Pop regarding the amlodipine noted in chart.  Also she reported that Mounjaro appeal was denied, although, she does not know why.  Appeal denial not noted in chart at this time.  Will review for further clarification.

## 2025-07-26 LAB
ANION GAP SERPL CALCULATED.4IONS-SCNC: 11 MMOL/L (CALC) (ref 7–17)
BACTERIA #/AREA URNS HPF: NORMAL /HPF
BUN SERPL-MCNC: 14 MG/DL (ref 7–25)
BUN/CREAT SERPL: ABNORMAL (CALC) (ref 6–22)
CALCIUM SERPL-MCNC: 9.6 MG/DL (ref 8.6–10.4)
CHLORIDE SERPL-SCNC: 102 MMOL/L (ref 98–110)
CO2 SERPL-SCNC: 27 MMOL/L (ref 20–32)
CREAT SERPL-MCNC: 0.61 MG/DL (ref 0.6–1)
CREAT UR-MCNC: 50 MG/DL (ref 20–275)
EGFRCR SERPLBLD CKD-EPI 2021: 95 ML/MIN/1.73M2
GLUCOSE SERPL-MCNC: 102 MG/DL (ref 65–99)
HYALINE CASTS #/AREA URNS LPF: NORMAL /LPF
POTASSIUM SERPL-SCNC: 5 MMOL/L (ref 3.5–5.3)
PROT UR-MCNC: 9 MG/DL (ref 5–24)
PROT/CREAT UR: 0.18 MG/MG CREAT (ref 0.02–0.18)
PROT/CREAT UR: 180 MG/G CREAT (ref 24–184)
RBC #/AREA URNS HPF: NORMAL /HPF
SERVICE CMNT-IMP: NORMAL
SODIUM SERPL-SCNC: 140 MMOL/L (ref 135–146)
SQUAMOUS #/AREA URNS HPF: NORMAL /HPF
WBC #/AREA URNS HPF: NORMAL /HPF

## 2025-09-04 ENCOUNTER — APPOINTMENT (OUTPATIENT)
Dept: PRIMARY CARE | Facility: CLINIC | Age: 75
End: 2025-09-04
Payer: MEDICARE

## 2025-09-04 PROBLEM — R42 DIZZY: Status: ACTIVE | Noted: 2025-09-04

## 2025-09-04 ASSESSMENT — ENCOUNTER SYMPTOMS: OCCASIONAL FEELINGS OF UNSTEADINESS: 1

## 2025-09-05 ASSESSMENT — ENCOUNTER SYMPTOMS
HEADACHES: 1
LIGHT-HEADEDNESS: 1

## 2025-09-17 ENCOUNTER — APPOINTMENT (OUTPATIENT)
Dept: PRIMARY CARE | Facility: CLINIC | Age: 75
End: 2025-09-17
Payer: MEDICARE

## 2025-10-22 ENCOUNTER — APPOINTMENT (OUTPATIENT)
Dept: NEPHROLOGY | Facility: CLINIC | Age: 75
End: 2025-10-22
Payer: MEDICARE

## 2025-10-30 ENCOUNTER — APPOINTMENT (OUTPATIENT)
Dept: NEPHROLOGY | Facility: CLINIC | Age: 75
End: 2025-10-30
Payer: MEDICARE

## 2025-10-30 ENCOUNTER — APPOINTMENT (OUTPATIENT)
Dept: PRIMARY CARE | Facility: CLINIC | Age: 75
End: 2025-10-30
Payer: MEDICARE

## 2025-11-12 ENCOUNTER — APPOINTMENT (OUTPATIENT)
Dept: PRIMARY CARE | Facility: CLINIC | Age: 75
End: 2025-11-12
Payer: MEDICARE